# Patient Record
Sex: FEMALE | Race: BLACK OR AFRICAN AMERICAN | NOT HISPANIC OR LATINO | URBAN - NONMETROPOLITAN AREA
[De-identification: names, ages, dates, MRNs, and addresses within clinical notes are randomized per-mention and may not be internally consistent; named-entity substitution may affect disease eponyms.]

---

## 2020-07-01 ENCOUNTER — OFFICE VISIT (OUTPATIENT)
Dept: URBAN - NONMETROPOLITAN AREA CLINIC 2 | Facility: CLINIC | Age: 50
End: 2020-07-01

## 2020-09-01 ENCOUNTER — TELEPHONE ENCOUNTER (OUTPATIENT)
Dept: URBAN - METROPOLITAN AREA CLINIC 92 | Facility: CLINIC | Age: 50
End: 2020-09-01

## 2020-09-01 RX ORDER — DIPHENOXYLATE HCL/ATROPINE 2.5-.025MG
TAKE 2 TABLETS (5 MG) BY ORAL ROUTE 4 TIMES PER DAY AS NEEDED FOR 30 DAYS TABLET ORAL
Qty: 240 | Refills: 3
Start: 2020-03-09

## 2020-09-10 ENCOUNTER — OFFICE VISIT (OUTPATIENT)
Dept: URBAN - NONMETROPOLITAN AREA CLINIC 2 | Facility: CLINIC | Age: 50
End: 2020-09-10

## 2020-09-23 ENCOUNTER — OFFICE VISIT (OUTPATIENT)
Dept: URBAN - NONMETROPOLITAN AREA CLINIC 2 | Facility: CLINIC | Age: 50
End: 2020-09-23
Payer: COMMERCIAL

## 2020-09-23 DIAGNOSIS — R19.7 DIARRHEA: ICD-10-CM

## 2020-09-23 DIAGNOSIS — R10.9 ABDOMINAL PAIN: ICD-10-CM

## 2020-09-23 DIAGNOSIS — R93.89 ABNORMAL FINDING ON IMAGING: ICD-10-CM

## 2020-09-23 DIAGNOSIS — K21.9 GERD (GASTROESOPHAGEAL REFLUX DISEASE): ICD-10-CM

## 2020-09-23 PROCEDURE — 99213 OFFICE O/P EST LOW 20 MIN: CPT | Performed by: NURSE PRACTITIONER

## 2020-09-23 PROCEDURE — G8427 DOCREV CUR MEDS BY ELIG CLIN: HCPCS | Performed by: NURSE PRACTITIONER

## 2020-09-23 PROCEDURE — G8420 CALC BMI NORM PARAMETERS: HCPCS | Performed by: NURSE PRACTITIONER

## 2020-09-23 PROCEDURE — 3017F COLORECTAL CA SCREEN DOC REV: CPT | Performed by: NURSE PRACTITIONER

## 2020-09-23 RX ORDER — AMITRIPTYLINE HYDROCHLORIDE 25 MG/1
1 TABLET AT BEDTIME TABLET, FILM COATED ORAL ONCE A DAY
Qty: 30 TABLET | Refills: 11
Start: 2020-04-28

## 2020-09-23 RX ORDER — LORAZEPAM 1 MG/1
TAKE 1 TABLET BY ORAL ROUTE 2 TIMES A DAY AS NEEDED TABLET ORAL 2
Qty: 0 | Refills: 0 | Status: ACTIVE | COMMUNITY
Start: 1900-01-01

## 2020-09-23 RX ORDER — FAMOTIDINE 40 MG/1
TAKE 1 TABLET (40 MG) BY ORAL ROUTE 2 TIMES PER DAY FOR 90 DAYS TABLET, FILM COATED ORAL 2
Qty: 180 | Refills: 3 | Status: ACTIVE | COMMUNITY
Start: 2019-12-30 | End: 2020-12-24

## 2020-09-23 RX ORDER — DIPHENOXYLATE HYDROCHLORIDE AND ATROPINE SULFATE 2.5; .025 MG/1; MG/1
TAKE 1 TABLET BY ORAL ROUTE 4 TIMES A DAY TABLET ORAL
Qty: 0 | Refills: 0 | Status: ACTIVE | COMMUNITY
Start: 1900-01-01

## 2020-09-23 RX ORDER — PROMETHAZINE HYDROCHLORIDE 6.25 MG/5ML
TAKE 5 MILLILITERS BY ORAL ROUTE 3 TIMES A DAY AS NEEDED SOLUTION ORAL
Qty: 0 | Refills: 0 | Status: ACTIVE | COMMUNITY
Start: 1900-01-01

## 2020-09-23 RX ORDER — CIPROFLOXACIN HYDROCHLORIDE 500 MG/1
1 TABLET TABLET, FILM COATED ORAL
Qty: 28 TABLET | Refills: 0 | OUTPATIENT
Start: 2020-09-23 | End: 2020-10-07

## 2020-09-23 RX ORDER — ZOLPIDEM TARTRATE 10 MG/1
TAKE 1 TABLET (10 MG) BY ORAL ROUTE ONCE DAILY AT BEDTIME TABLET, FILM COATED ORAL 1
Qty: 0 | Refills: 0 | Status: ACTIVE | COMMUNITY
Start: 1900-01-01

## 2020-09-23 RX ORDER — DICYCLOMINE HYDROCHLORIDE 10 MG/1
TAKE 1 CAPSULE (10 MG) BY ORAL ROUTE 3 TIMES PER DAY FOR 30 DAYS CAPSULE ORAL
Qty: 90 | Refills: 6
Start: 2020-04-28

## 2020-09-23 RX ORDER — DIPHENOXYLATE HCL/ATROPINE 2.5-.025MG
TAKE 2 TABLETS (5 MG) BY ORAL ROUTE 4 TIMES PER DAY AS NEEDED FOR 30 DAYS TABLET ORAL
Qty: 240 | Refills: 3 | Status: ACTIVE | COMMUNITY
Start: 2020-03-09

## 2020-09-23 RX ORDER — OXYBUTYNIN CHLORIDE 5 MG/1
TAKE 1 TABLET BY ORAL ROUTE DAILY TABLET ORAL 1
Qty: 0 | Refills: 0 | Status: ACTIVE | COMMUNITY
Start: 1900-01-01

## 2020-09-23 RX ORDER — AMITRIPTYLINE HYDROCHLORIDE 10 MG/1
TAKE 1 TABLET BY ORAL ROUTE ONCE A DAY (AT BEDTIME) TABLET, FILM COATED ORAL 1
Qty: 30 | Refills: 11 | Status: ACTIVE | COMMUNITY
Start: 2020-04-28 | End: 2021-04-23

## 2020-09-23 RX ORDER — CETIRIZINE HYDROCHLORIDE 10 MG/1
TAKE 1 TABLET (10 MG) BY ORAL ROUTE ONCE DAILY TABLET, FILM COATED ORAL 1
Qty: 0 | Refills: 0 | Status: ACTIVE | COMMUNITY
Start: 1900-01-01

## 2020-09-23 RX ORDER — CHOLESTYRAMINE 4 G/9G
TAKE 2 SCOOPS (8 GRAM) EACH SCOOP BEING DISSOLVED IN 2 TO 6 OUNCES OF WATER OR NONCARBONATED BEVERAGE BY PO BID POWDER, FOR SUSPENSION ORAL 2
Qty: 30 | Refills: 6 | Status: ACTIVE | COMMUNITY
Start: 2020-04-28 | End: 2020-11-24

## 2020-09-23 RX ORDER — DICYCLOMINE HYDROCHLORIDE 10 MG/1
TAKE 1 CAPSULE (10 MG) BY ORAL ROUTE 3 TIMES PER DAY FOR 30 DAYS CAPSULE ORAL
Qty: 90 | Refills: 6 | Status: ACTIVE | COMMUNITY
Start: 2020-04-28 | End: 2020-11-24

## 2020-09-23 RX ORDER — METRONIDAZOLE 500 MG/1
1 TABLET TABLET, FILM COATED ORAL THREE TIMES A DAY
Qty: 42 TABLET | Refills: 0 | OUTPATIENT
Start: 2020-09-23 | End: 2020-10-07

## 2020-09-23 RX ORDER — POTASSIUM CHLORIDE 1500 MG/1
TAKE 1 TABLET (20 MEQ) BY ORAL ROUTE ONCE DAILY WITH FOOD TABLET, FILM COATED, EXTENDED RELEASE ORAL 1
Qty: 0 | Refills: 0 | Status: ACTIVE | COMMUNITY
Start: 1900-01-01

## 2020-09-23 RX ORDER — FUROSEMIDE 40 MG/1
TAKE 1 TABLET (40 MG) BY ORAL ROUTE 2 TIMES PER DAY TABLET ORAL 2
Qty: 0 | Refills: 0 | Status: ACTIVE | COMMUNITY
Start: 1900-01-01

## 2020-09-23 RX ORDER — KETOCONAZOLE 20 MG/G
APPLY TO THE AFFECTED AREA(S) BY TOPICAL ROUTE ONCE DAILY CREAM TOPICAL 1
Qty: 1 | Refills: 0 | Status: ACTIVE | COMMUNITY
Start: 1900-01-01

## 2020-09-23 RX ORDER — HYDRALAZINE HYDROCHLORIDE 50 MG/1
TAKE 1 TABLET BY ORAL ROUTE 3 TIMES A DAY TABLET ORAL
Qty: 0 | Refills: 0 | Status: ACTIVE | COMMUNITY
Start: 1900-01-01

## 2020-09-23 RX ORDER — OMEPRAZOLE 20 MG/1
TAKE 1 CAPSULE (20 MG) BY ORAL ROUTE ONCE DAILY BEFORE A MEAL CAPSULE, DELAYED RELEASE ORAL 1
Qty: 0 | Refills: 0 | Status: ACTIVE | COMMUNITY
Start: 1900-01-01

## 2020-09-23 RX ORDER — ALBUTEROL SULFATE 108 UG/1
INHALE 1 PUFF (90 MCG) BY INHALATION ROUTE EVERY 6 HOURS AS NEEDED AEROSOL, METERED RESPIRATORY (INHALATION)
Qty: 1 | Refills: 0 | Status: ACTIVE | COMMUNITY
Start: 1900-01-01

## 2020-09-23 RX ORDER — GABAPENTIN 300 MG/1
TAKE 1 CAPSULE (300 MG) BY ORAL ROUTE 3 TIMES PER DAY CAPSULE ORAL
Qty: 0 | Refills: 0 | Status: ACTIVE | COMMUNITY
Start: 1900-01-01

## 2020-09-23 RX ORDER — TIZANIDINE HYDROCHLORIDE 4 MG/1
TAKE 1 CAPSULE (4 MG) BY ORAL ROUTE 3 TIMES PER DAY CAPSULE ORAL
Qty: 0 | Refills: 0 | Status: ACTIVE | COMMUNITY
Start: 1900-01-01

## 2020-09-23 RX ORDER — HALOBETASOL PROPIONATE 0.5 MG/G
APPLY A THIN LAYER TO THE AFFECTED AREA(S) BY TOPICAL ROUTE ONCE DAILY CREAM TOPICAL 1
Qty: 1 | Refills: 0 | Status: ACTIVE | COMMUNITY
Start: 1900-01-01

## 2020-09-23 RX ORDER — PROMETHAZINE HYDROCHLORIDE 25 MG/1
TAKE 1 TABLET (25 MG) BY ORAL ROUTE EVERY 4-6 HOURS AS NEEDED TABLET ORAL
Qty: 0 | Refills: 0 | Status: ACTIVE | COMMUNITY
Start: 1900-01-01

## 2020-09-23 RX ORDER — PROPRANOLOL HYDROCHLORIDE 80 MG/1
TAKE 1 TABLET BY ORAL ROUTE DAILY TABLET ORAL 1
Qty: 0 | Refills: 0 | Status: ACTIVE | COMMUNITY
Start: 1900-01-01

## 2020-09-23 RX ORDER — CHOLESTYRAMINE 4 G/9G
TAKE 2 SCOOPS (8 GRAM) EACH SCOOP BEING DISSOLVED IN 2 TO 6 OUNCES OF WATER OR NONCARBONATED BEVERAGE BY PO BID POWDER, FOR SUSPENSION ORAL 2
Qty: 30 | Refills: 6
Start: 2020-04-28

## 2020-09-23 RX ORDER — HYDROCODONE BITARTRATE AND ACETAMINOPHEN 10; 325 MG/1; MG/1
TAKE 1 TABLET BY ORAL ROUTE EVERY 6 HOURS AS NEEDED FOR PAIN TABLET ORAL
Qty: 0 | Refills: 0 | Status: ACTIVE | COMMUNITY
Start: 1900-01-01

## 2020-09-23 RX ORDER — DIPHENOXYLATE HYDROCHLORIDE AND ATROPINE SULFATE 2.5; .025 MG/1; MG/1
TAKE 1 TABLET BY ORAL ROUTE 4 TIMES A DAY TABLET ORAL
Qty: 120 | Refills: 6
Start: 1900-01-01 | End: 1900-07-30

## 2020-09-23 RX ORDER — HYDROXYZINE HYDROCHLORIDE 25 MG/1
TAKE 1 TABLET BY ORAL ROUTE 4 TIMES A DAY TABLET, FILM COATED ORAL
Qty: 0 | Refills: 0 | Status: ACTIVE | COMMUNITY
Start: 1900-01-01

## 2020-09-23 NOTE — HPI-TODAY'S VISIT:
Ms. Riddle is here for f/u of abdominal pain and diarrhea. She was last seen in April. She was doing ok on her medicaiton regimen of lomotil, questran, AMT, bentyl. She never completed the stool studies. She has had recurrent LUQ pain. She thinks she may have a hernia. Last CT scan showed possible diverticulitis. She has been under a lot of stress and her fiance passed away from a fall at work. She denies any changes in her weight. She denies any bleeding. CS

## 2020-11-04 ENCOUNTER — OFFICE VISIT (OUTPATIENT)
Dept: URBAN - NONMETROPOLITAN AREA CLINIC 2 | Facility: CLINIC | Age: 50
End: 2020-11-04
Payer: COMMERCIAL

## 2020-11-04 ENCOUNTER — LAB OUTSIDE AN ENCOUNTER (OUTPATIENT)
Dept: URBAN - NONMETROPOLITAN AREA CLINIC 2 | Facility: CLINIC | Age: 50
End: 2020-11-04

## 2020-11-04 DIAGNOSIS — R19.7 DIARRHEA: ICD-10-CM

## 2020-11-04 DIAGNOSIS — K21.9 GERD (GASTROESOPHAGEAL REFLUX DISEASE): ICD-10-CM

## 2020-11-04 DIAGNOSIS — R93.89 ABNORMAL FINDING ON IMAGING: ICD-10-CM

## 2020-11-04 DIAGNOSIS — R10.9 ABDOMINAL PAIN: ICD-10-CM

## 2020-11-04 PROCEDURE — 99213 OFFICE O/P EST LOW 20 MIN: CPT | Performed by: NURSE PRACTITIONER

## 2020-11-04 PROCEDURE — G8427 DOCREV CUR MEDS BY ELIG CLIN: HCPCS | Performed by: NURSE PRACTITIONER

## 2020-11-04 PROCEDURE — G8420 CALC BMI NORM PARAMETERS: HCPCS | Performed by: NURSE PRACTITIONER

## 2020-11-04 PROCEDURE — 3017F COLORECTAL CA SCREEN DOC REV: CPT | Performed by: NURSE PRACTITIONER

## 2020-11-04 RX ORDER — AMITRIPTYLINE HYDROCHLORIDE 25 MG/1
1 TABLET AT BEDTIME TABLET, FILM COATED ORAL ONCE A DAY
Qty: 30 TABLET | Refills: 11 | Status: ACTIVE | COMMUNITY
Start: 2020-04-28

## 2020-11-04 RX ORDER — FUROSEMIDE 40 MG/1
TAKE 1 TABLET (40 MG) BY ORAL ROUTE 2 TIMES PER DAY TABLET ORAL 2
Qty: 0 | Refills: 0 | Status: ACTIVE | COMMUNITY
Start: 1900-01-01

## 2020-11-04 RX ORDER — CHOLESTYRAMINE 4 G/9G
TAKE 2 SCOOPS (8 GRAM) EACH SCOOP BEING DISSOLVED IN 2 TO 6 OUNCES OF WATER OR NONCARBONATED BEVERAGE BY PO BID POWDER, FOR SUSPENSION ORAL 2
Qty: 30 | Refills: 6 | Status: ACTIVE | COMMUNITY
Start: 2020-04-28

## 2020-11-04 RX ORDER — DICYCLOMINE HYDROCHLORIDE 10 MG/1
TAKE 1 CAPSULE (10 MG) BY ORAL ROUTE 3 TIMES PER DAY FOR 30 DAYS CAPSULE ORAL
Qty: 90 | Refills: 6 | Status: ACTIVE | COMMUNITY
Start: 2020-04-28

## 2020-11-04 RX ORDER — TIZANIDINE HYDROCHLORIDE 4 MG/1
TAKE 1 CAPSULE (4 MG) BY ORAL ROUTE 3 TIMES PER DAY CAPSULE ORAL
Qty: 0 | Refills: 0 | Status: ACTIVE | COMMUNITY
Start: 1900-01-01

## 2020-11-04 RX ORDER — OMEPRAZOLE 20 MG/1
TAKE 1 CAPSULE (20 MG) BY ORAL ROUTE ONCE DAILY BEFORE A MEAL CAPSULE, DELAYED RELEASE ORAL 1
Qty: 0 | Refills: 0 | Status: ACTIVE | COMMUNITY
Start: 1900-01-01

## 2020-11-04 RX ORDER — AMITRIPTYLINE HYDROCHLORIDE 25 MG/1
1 TABLET AT BEDTIME TABLET, FILM COATED ORAL ONCE A DAY
Qty: 30 TABLET | Refills: 11

## 2020-11-04 RX ORDER — PROPRANOLOL HYDROCHLORIDE 80 MG/1
TAKE 1 TABLET BY ORAL ROUTE DAILY TABLET ORAL 1
Qty: 0 | Refills: 0 | Status: ACTIVE | COMMUNITY
Start: 1900-01-01

## 2020-11-04 RX ORDER — HYDRALAZINE HYDROCHLORIDE 50 MG/1
TAKE 1 TABLET BY ORAL ROUTE 3 TIMES A DAY TABLET ORAL
Qty: 0 | Refills: 0 | Status: ACTIVE | COMMUNITY
Start: 1900-01-01

## 2020-11-04 RX ORDER — CETIRIZINE HYDROCHLORIDE 10 MG/1
TAKE 1 TABLET (10 MG) BY ORAL ROUTE ONCE DAILY TABLET, FILM COATED ORAL 1
Qty: 0 | Refills: 0 | Status: ACTIVE | COMMUNITY
Start: 1900-01-01

## 2020-11-04 RX ORDER — KETOCONAZOLE 20 MG/G
APPLY TO THE AFFECTED AREA(S) BY TOPICAL ROUTE ONCE DAILY CREAM TOPICAL 1
Qty: 1 | Refills: 0 | Status: ACTIVE | COMMUNITY
Start: 1900-01-01

## 2020-11-04 RX ORDER — CHOLESTYRAMINE 4 G/9G
TAKE 2 SCOOPS (8 GRAM) EACH SCOOP BEING DISSOLVED IN 2 TO 6 OUNCES OF WATER OR NONCARBONATED BEVERAGE BY PO BID POWDER, FOR SUSPENSION ORAL 2
Qty: 30 | Refills: 6

## 2020-11-04 RX ORDER — HALOBETASOL PROPIONATE 0.5 MG/G
APPLY A THIN LAYER TO THE AFFECTED AREA(S) BY TOPICAL ROUTE ONCE DAILY CREAM TOPICAL 1
Qty: 1 | Refills: 0 | Status: ACTIVE | COMMUNITY
Start: 1900-01-01

## 2020-11-04 RX ORDER — LORAZEPAM 1 MG/1
TAKE 1 TABLET BY ORAL ROUTE 2 TIMES A DAY AS NEEDED TABLET ORAL 2
Qty: 0 | Refills: 0 | Status: ACTIVE | COMMUNITY
Start: 1900-01-01

## 2020-11-04 RX ORDER — ZOLPIDEM TARTRATE 10 MG/1
TAKE 1 TABLET (10 MG) BY ORAL ROUTE ONCE DAILY AT BEDTIME TABLET, FILM COATED ORAL 1
Qty: 0 | Refills: 0 | Status: ACTIVE | COMMUNITY
Start: 1900-01-01

## 2020-11-04 RX ORDER — PROMETHAZINE HYDROCHLORIDE 6.25 MG/5ML
TAKE 5 MILLILITERS BY ORAL ROUTE 3 TIMES A DAY AS NEEDED SOLUTION ORAL
Qty: 0 | Refills: 0 | Status: ACTIVE | COMMUNITY
Start: 1900-01-01

## 2020-11-04 RX ORDER — FAMOTIDINE 40 MG/1
TAKE 1 TABLET (40 MG) BY ORAL ROUTE 2 TIMES PER DAY FOR 90 DAYS TABLET, FILM COATED ORAL 2
Qty: 180 | Refills: 3 | Status: ACTIVE | COMMUNITY
Start: 2019-12-30 | End: 2020-12-24

## 2020-11-04 RX ORDER — DIPHENOXYLATE HCL/ATROPINE 2.5-.025MG
TAKE 2 TABLETS (5 MG) BY ORAL ROUTE 4 TIMES PER DAY AS NEEDED FOR 30 DAYS TABLET ORAL
Qty: 240 | Refills: 3 | Status: ACTIVE | COMMUNITY
Start: 2020-03-09

## 2020-11-04 RX ORDER — GABAPENTIN 300 MG/1
TAKE 1 CAPSULE (300 MG) BY ORAL ROUTE 3 TIMES PER DAY CAPSULE ORAL
Qty: 0 | Refills: 0 | Status: ACTIVE | COMMUNITY
Start: 1900-01-01

## 2020-11-04 RX ORDER — POTASSIUM CHLORIDE 1500 MG/1
TAKE 1 TABLET (20 MEQ) BY ORAL ROUTE ONCE DAILY WITH FOOD TABLET, FILM COATED, EXTENDED RELEASE ORAL 1
Qty: 0 | Refills: 0 | Status: ACTIVE | COMMUNITY
Start: 1900-01-01

## 2020-11-04 RX ORDER — PROMETHAZINE HYDROCHLORIDE 25 MG/1
TAKE 1 TABLET (25 MG) BY ORAL ROUTE EVERY 4-6 HOURS AS NEEDED TABLET ORAL
Qty: 0 | Refills: 0 | Status: ACTIVE | COMMUNITY
Start: 1900-01-01

## 2020-11-04 RX ORDER — DIPHENOXYLATE HCL/ATROPINE 2.5-.025MG
TAKE 2 TABLETS (5 MG) BY ORAL ROUTE 4 TIMES PER DAY AS NEEDED FOR 30 DAYS TABLET ORAL
Qty: 240 | Refills: 3
Start: 2020-03-09 | End: 2020-07-07

## 2020-11-04 RX ORDER — OXYBUTYNIN CHLORIDE 5 MG/1
TAKE 1 TABLET BY ORAL ROUTE DAILY TABLET ORAL 1
Qty: 0 | Refills: 0 | Status: ACTIVE | COMMUNITY
Start: 1900-01-01

## 2020-11-04 RX ORDER — HYDROCODONE BITARTRATE AND ACETAMINOPHEN 10; 325 MG/1; MG/1
TAKE 1 TABLET BY ORAL ROUTE EVERY 6 HOURS AS NEEDED FOR PAIN TABLET ORAL
Qty: 0 | Refills: 0 | Status: ACTIVE | COMMUNITY
Start: 1900-01-01

## 2020-11-04 RX ORDER — DICYCLOMINE HYDROCHLORIDE 10 MG/1
TAKE 1 CAPSULE (10 MG) BY ORAL ROUTE 3 TIMES PER DAY FOR 30 DAYS CAPSULE ORAL
Qty: 90 | Refills: 6

## 2020-11-04 RX ORDER — ALBUTEROL SULFATE 108 UG/1
INHALE 1 PUFF (90 MCG) BY INHALATION ROUTE EVERY 6 HOURS AS NEEDED AEROSOL, METERED RESPIRATORY (INHALATION)
Qty: 1 | Refills: 0 | Status: ACTIVE | COMMUNITY
Start: 1900-01-01

## 2020-11-04 RX ORDER — HYDROXYZINE HYDROCHLORIDE 25 MG/1
TAKE 1 TABLET BY ORAL ROUTE 4 TIMES A DAY TABLET, FILM COATED ORAL
Qty: 0 | Refills: 0 | Status: ACTIVE | COMMUNITY
Start: 1900-01-01

## 2020-11-04 NOTE — HPI-OTHER HISTORIES
History Of Present Illness    2019 Ms. Daphnie Riddle is here for f/u of chronic diarrhea and abdominal pain. She has a very complicated GI history. She has been treated by Dr. Garcia most recently. She had a colon perforation several years ago with subsequent right sided colon resection. She had an ostomy that was able to be reversed. She then had a ventral hernia that was repaired with mesh. She has had lower abdominal pain since. She had a CT scan with a low ventral hernia with a loop of SB. This is non obstructive at this time. She is taking bentyl with some improvement.  She has been having terrible diarrhea multiple times a day since her colon surgery.  She had normal stool studies and labs with a positive t-transglutaminase (tTG) IgG and elevated inflammatory markers. She had a normal EGD and colonoscopy with normal random bx. She is having some improvement with lomotil and cholestryramine. Her reflux is a little worse today. CS  2019 Ms. Riddle is here for f/u of diarrhea and GERD. Her cholestyramine was increased at her last OV. Her bowels are now fairly normal. She can now leave the house without worrying about having an accident. She continues to have some mid abdominal cramping. She is not taking the bentyl. She was started on zantac at her last OV and now her reflux is well controlled. Overall she is feeling better today. CS  2019 Ms. Riddle is here for diarrhea and abdominal pain. Her bowels are fairly normal with questran QID and lomotil prn. She has had to use the lomotil more frequently since her hernia surgery on the . She is in a lot of abdominal pain. This is around her ribs and incisions. She follows up with her surgeon 1/3. She is taking the bentyl 10mg BID. She has not noticed a change but not sure since she had surgery. Her reflux is not controlled on pepcid 20mg BID- worse at night. CS  3/9/2020 Ms. Riddle is here for f/u of abdominal pain and diarrhea. She was last seen in December after her hernia surgery. She saw Dr. Ritter soon after. She had a CT scan that showed inflammation of her colon along an area of a diverticuli. He ordered stool studies and was going to treat for diverticulitis if negative. She could not do the stool studies due to transportation. She was advised to f/u with us. She continues to have mid abdominal pain worse on the right midline. She continues to have diarrhea worse after eating despite choelstryramine and bentyl. She admits to very high stress. Her mother  last week and prior to that her aunt. 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 CS     Daphnie returns for follow up. She is doing ok  but still having pain. She has not had stool studies sent in as she has had three deaths in the family and dealing with children during the COVID issues. She is tolerating symptoms relatively well. Seems to be managing relatively well.  She has a lot of stress with her family in the house during this shelter at home time.  Her symptoms are tolerable but she is needing medications refilled.  She needs refills for lomoil, questran, AMT 10mg, bentyl.  I spent 7 minutes on telephone call as she could not get Tele[facility] appointment set up properly   2020 Ms. Riddle is here for f/u of abdominal pain and diarrhea. She was last seen in April. She was doing ok on her medicaiton regimen of lomotil, questran, AMT, bentyl. She never completed the stool studies. She has had recurrent LUQ pain. She thinks she may have a hernia. Last CT scan showed possible diverticulitis. She has been under a lot of stress and her fiance passed away from a fall at work. She denies any changes in her weight. She denies any bleeding. CS

## 2020-11-04 NOTE — HPI-TODAY'S VISIT:
Ms. Riddle is here for f/u of abdominal pain and diarrhea. At her last OV, she was having a lot of diarrhea and abdominal pain. She was treated for possible diverticulitis with cipro and flagyl. She had an increase in her stress level after the death of her fiance. Her AMT was increased to 25mg. Today, her abdominal pain is about the same. It was getting better while on the antibx and then returned as soon as they were completed. She denies any fevers. Her diarrhea is about the same. She is out of lomotil. When she takes 2 pills at a time this does help. She is also identifying triggers. CS

## 2020-11-05 LAB
A/G RATIO: 1.6
ALBUMIN: 4.3
ALKALINE PHOSPHATASE: 112
ALT (SGPT): 35
AST (SGOT): 17
BASO (ABSOLUTE): 0.1
BASOS: 1
BILIRUBIN, TOTAL: <0.2
BUN/CREATININE RATIO: 11
BUN: 7
C-REACTIVE PROTEIN, QUANT: 4
CALCIUM: 9
CARBON DIOXIDE, TOTAL: 22
CHLORIDE: 107
CREATININE: 0.65
EGFR IF AFRICN AM: 118
EGFR IF NONAFRICN AM: 102
EOS (ABSOLUTE): 0.1
EOS: 1
GLOBULIN, TOTAL: 2.7
GLUCOSE: 88
HEMATOCRIT: 40.2
HEMATOLOGY COMMENTS:: (no result)
HEMOGLOBIN: 12.9
IMMATURE CELLS: (no result)
IMMATURE GRANS (ABS): 0
IMMATURE GRANULOCYTES: 0
LYMPHS (ABSOLUTE): 2
LYMPHS: 31
MCH: 28.9
MCHC: 32.1
MCV: 90
MONOCYTES(ABSOLUTE): 0.4
MONOCYTES: 7
NEUTROPHILS (ABSOLUTE): 3.8
NEUTROPHILS: 60
NRBC: (no result)
PLATELETS: 196
POTASSIUM: 4
PROTEIN, TOTAL: 7
RBC: 4.46
RDW: 13.8
SEDIMENTATION RATE-WESTERGREN: 41
SODIUM: 142
WBC: 6.3

## 2021-03-24 ENCOUNTER — LAB OUTSIDE AN ENCOUNTER (OUTPATIENT)
Dept: URBAN - NONMETROPOLITAN AREA CLINIC 2 | Facility: CLINIC | Age: 51
End: 2021-03-24

## 2021-03-24 ENCOUNTER — OFFICE VISIT (OUTPATIENT)
Dept: URBAN - NONMETROPOLITAN AREA CLINIC 2 | Facility: CLINIC | Age: 51
End: 2021-03-24
Payer: COMMERCIAL

## 2021-03-24 VITALS
TEMPERATURE: 96.9 F | DIASTOLIC BLOOD PRESSURE: 101 MMHG | HEART RATE: 77 BPM | BODY MASS INDEX: 35.5 KG/M2 | WEIGHT: 248 LBS | HEIGHT: 70 IN | SYSTOLIC BLOOD PRESSURE: 167 MMHG

## 2021-03-24 DIAGNOSIS — K21.9 GERD (GASTROESOPHAGEAL REFLUX DISEASE): ICD-10-CM

## 2021-03-24 DIAGNOSIS — R93.89 ABNORMAL FINDING ON IMAGING: ICD-10-CM

## 2021-03-24 DIAGNOSIS — R19.7 DIARRHEA: ICD-10-CM

## 2021-03-24 DIAGNOSIS — R10.9 ABDOMINAL PAIN: ICD-10-CM

## 2021-03-24 PROCEDURE — 99214 OFFICE O/P EST MOD 30 MIN: CPT | Performed by: NURSE PRACTITIONER

## 2021-03-24 RX ORDER — METRONIDAZOLE 250 MG/1
1 TABLET TABLET ORAL THREE TIMES A DAY
Qty: 42 TABLET | Refills: 0 | OUTPATIENT
Start: 2021-03-24 | End: 2021-04-07

## 2021-03-24 RX ORDER — DICYCLOMINE HYDROCHLORIDE 10 MG/1
TAKE 1 CAPSULE (10 MG) BY ORAL ROUTE 3 TIMES PER DAY FOR 30 DAYS CAPSULE ORAL
Qty: 90 | Refills: 6 | Status: ACTIVE | COMMUNITY

## 2021-03-24 RX ORDER — HYDROCODONE BITARTRATE AND ACETAMINOPHEN 10; 325 MG/1; MG/1
TAKE 1 TABLET BY ORAL ROUTE EVERY 6 HOURS AS NEEDED FOR PAIN TABLET ORAL
Qty: 0 | Refills: 0 | Status: ACTIVE | COMMUNITY
Start: 1900-01-01

## 2021-03-24 RX ORDER — HYDROXYZINE HYDROCHLORIDE 25 MG/1
TAKE 1 TABLET BY ORAL ROUTE 4 TIMES A DAY TABLET, FILM COATED ORAL
Qty: 0 | Refills: 0 | Status: ACTIVE | COMMUNITY
Start: 1900-01-01

## 2021-03-24 RX ORDER — LORAZEPAM 1 MG/1
TAKE 1 TABLET BY ORAL ROUTE 2 TIMES A DAY AS NEEDED TABLET ORAL 2
Qty: 0 | Refills: 0 | Status: ACTIVE | COMMUNITY
Start: 1900-01-01

## 2021-03-24 RX ORDER — DIPHENOXYLATE HYDROCHLORIDE AND ATROPINE SULFATE 2.5; .025 MG/1; MG/1
TAKE 1 TABLET BY ORAL ROUTE 4 TIMES A DAY TABLET ORAL
Qty: 120 | Refills: 6

## 2021-03-24 RX ORDER — TIZANIDINE HYDROCHLORIDE 4 MG/1
TAKE 1 CAPSULE (4 MG) BY ORAL ROUTE 3 TIMES PER DAY CAPSULE ORAL
Qty: 0 | Refills: 0 | Status: ACTIVE | COMMUNITY
Start: 1900-01-01

## 2021-03-24 RX ORDER — CHOLESTYRAMINE 4 G/9G
TAKE 2 SCOOPS (8 GRAM) EACH SCOOP BEING DISSOLVED IN 2 TO 6 OUNCES OF WATER OR NONCARBONATED BEVERAGE BY PO BID POWDER, FOR SUSPENSION ORAL 2
Qty: 30 | Refills: 6 | Status: ACTIVE | COMMUNITY

## 2021-03-24 RX ORDER — AMITRIPTYLINE HYDROCHLORIDE 25 MG/1
1 TABLET AT BEDTIME TABLET, FILM COATED ORAL ONCE A DAY
Qty: 30 TABLET | Refills: 11

## 2021-03-24 RX ORDER — OMEPRAZOLE 20 MG/1
TAKE 1 CAPSULE (20 MG) BY ORAL ROUTE ONCE DAILY BEFORE A MEAL CAPSULE, DELAYED RELEASE ORAL 1
Qty: 0 | Refills: 0 | Status: ACTIVE | COMMUNITY
Start: 1900-01-01

## 2021-03-24 RX ORDER — ZOLPIDEM TARTRATE 10 MG/1
TAKE 1 TABLET (10 MG) BY ORAL ROUTE ONCE DAILY AT BEDTIME TABLET, FILM COATED ORAL 1
Qty: 0 | Refills: 0 | Status: ACTIVE | COMMUNITY
Start: 1900-01-01

## 2021-03-24 RX ORDER — PROMETHAZINE HYDROCHLORIDE 25 MG/1
TAKE 1 TABLET (25 MG) BY ORAL ROUTE EVERY 4-6 HOURS AS NEEDED TABLET ORAL
Qty: 0 | Refills: 0 | Status: ACTIVE | COMMUNITY
Start: 1900-01-01

## 2021-03-24 RX ORDER — AMITRIPTYLINE HYDROCHLORIDE 25 MG/1
1 TABLET AT BEDTIME TABLET, FILM COATED ORAL ONCE A DAY
Qty: 30 TABLET | Refills: 11 | Status: ACTIVE | COMMUNITY

## 2021-03-24 RX ORDER — OXYBUTYNIN CHLORIDE 5 MG/1
TAKE 1 TABLET BY ORAL ROUTE DAILY TABLET ORAL 1
Qty: 0 | Refills: 0 | Status: ACTIVE | COMMUNITY
Start: 1900-01-01

## 2021-03-24 RX ORDER — KETOCONAZOLE 20 MG/G
APPLY TO THE AFFECTED AREA(S) BY TOPICAL ROUTE ONCE DAILY CREAM TOPICAL 1
Qty: 1 | Refills: 0 | Status: ACTIVE | COMMUNITY
Start: 1900-01-01

## 2021-03-24 RX ORDER — HYDRALAZINE HYDROCHLORIDE 50 MG/1
TAKE 1 TABLET BY ORAL ROUTE 3 TIMES A DAY TABLET ORAL
Qty: 0 | Refills: 0 | Status: ACTIVE | COMMUNITY
Start: 1900-01-01

## 2021-03-24 RX ORDER — DICYCLOMINE HYDROCHLORIDE 10 MG/1
TAKE 1 CAPSULE (10 MG) BY ORAL ROUTE 3 TIMES PER DAY FOR 30 DAYS CAPSULE ORAL
Qty: 90 | Refills: 6

## 2021-03-24 RX ORDER — FUROSEMIDE 40 MG/1
TAKE 1 TABLET (40 MG) BY ORAL ROUTE 2 TIMES PER DAY TABLET ORAL 2
Qty: 0 | Refills: 0 | Status: ACTIVE | COMMUNITY
Start: 1900-01-01

## 2021-03-24 RX ORDER — PROMETHAZINE HYDROCHLORIDE 6.25 MG/5ML
TAKE 5 MILLILITERS BY ORAL ROUTE 3 TIMES A DAY AS NEEDED SOLUTION ORAL
Qty: 0 | Refills: 0 | Status: ACTIVE | COMMUNITY
Start: 1900-01-01

## 2021-03-24 RX ORDER — GABAPENTIN 300 MG/1
TAKE 1 CAPSULE (300 MG) BY ORAL ROUTE 3 TIMES PER DAY CAPSULE ORAL
Qty: 0 | Refills: 0 | Status: ACTIVE | COMMUNITY
Start: 1900-01-01

## 2021-03-24 RX ORDER — CETIRIZINE HYDROCHLORIDE 10 MG/1
TAKE 1 TABLET (10 MG) BY ORAL ROUTE ONCE DAILY TABLET, FILM COATED ORAL 1
Qty: 0 | Refills: 0 | Status: ACTIVE | COMMUNITY
Start: 1900-01-01

## 2021-03-24 RX ORDER — PROPRANOLOL HYDROCHLORIDE 80 MG/1
TAKE 1 TABLET BY ORAL ROUTE DAILY TABLET ORAL 1
Qty: 0 | Refills: 0 | Status: ACTIVE | COMMUNITY
Start: 1900-01-01

## 2021-03-24 RX ORDER — ALBUTEROL SULFATE 108 UG/1
INHALE 1 PUFF (90 MCG) BY INHALATION ROUTE EVERY 6 HOURS AS NEEDED AEROSOL, METERED RESPIRATORY (INHALATION)
Qty: 1 | Refills: 0 | Status: ACTIVE | COMMUNITY
Start: 1900-01-01

## 2021-03-24 RX ORDER — HALOBETASOL PROPIONATE 0.5 MG/G
APPLY A THIN LAYER TO THE AFFECTED AREA(S) BY TOPICAL ROUTE ONCE DAILY CREAM TOPICAL 1
Qty: 1 | Refills: 0 | Status: ACTIVE | COMMUNITY
Start: 1900-01-01

## 2021-03-24 RX ORDER — POTASSIUM CHLORIDE 1500 MG/1
TAKE 1 TABLET (20 MEQ) BY ORAL ROUTE ONCE DAILY WITH FOOD TABLET, FILM COATED, EXTENDED RELEASE ORAL 1
Qty: 0 | Refills: 0 | Status: ACTIVE | COMMUNITY
Start: 1900-01-01

## 2021-03-24 RX ORDER — CHOLESTYRAMINE 4 G/9G
TAKE 2 SCOOPS (8 GRAM) EACH SCOOP BEING DISSOLVED IN 2 TO 6 OUNCES OF WATER OR NONCARBONATED BEVERAGE BY PO BID POWDER, FOR SUSPENSION ORAL 2
Qty: 30 | Refills: 6

## 2021-03-24 NOTE — HPI-OTHER HISTORIES
History Of Present Illness    2019 Ms. Daphnie Riddle is here for f/u of chronic diarrhea and abdominal pain. She has a very complicated GI history. She has been treated by Dr. Garcia most recently. She had a colon perforation several years ago with subsequent right sided colon resection. She had an ostomy that was able to be reversed. She then had a ventral hernia that was repaired with mesh. She has had lower abdominal pain since. She had a CT scan with a low ventral hernia with a loop of SB. This is non obstructive at this time. She is taking bentyl with some improvement.  She has been having terrible diarrhea multiple times a day since her colon surgery.  She had normal stool studies and labs with a positive t-transglutaminase (tTG) IgG and elevated inflammatory markers. She had a normal EGD and colonoscopy with normal random bx. She is having some improvement with lomotil and cholestryramine. Her reflux is a little worse today. CS  2019 Ms. Riddle is here for f/u of diarrhea and GERD. Her cholestyramine was increased at her last OV. Her bowels are now fairly normal. She can now leave the house without worrying about having an accident. She continues to have some mid abdominal cramping. She is not taking the bentyl. She was started on zantac at her last OV and now her reflux is well controlled. Overall she is feeling better today. CS  2019 Ms. Riddle is here for diarrhea and abdominal pain. Her bowels are fairly normal with questran QID and lomotil prn. She has had to use the lomotil more frequently since her hernia surgery on the . She is in a lot of abdominal pain. This is around her ribs and incisions. She follows up with her surgeon 1/3. She is taking the bentyl 10mg BID. She has not noticed a change but not sure since she had surgery. Her reflux is not controlled on pepcid 20mg BID- worse at night. CS  3/9/2020 Ms. Riddle is here for f/u of abdominal pain and diarrhea. She was last seen in December after her hernia surgery. She saw Dr. Ritter soon after. She had a CT scan that showed inflammation of her colon along an area of a diverticuli. He ordered stool studies and was going to treat for diverticulitis if negative. She could not do the stool studies due to transportation. She was advised to f/u with us. She continues to have mid abdominal pain worse on the right midline. She continues to have diarrhea worse after eating despite choelstryramine and bentyl. She admits to very high stress. Her mother  last week and prior to that her aunt. 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 CS     Daphnie returns for follow up. She is doing ok  but still having pain. She has not had stool studies sent in as she has had three deaths in the family and dealing with children during the COVID issues. She is tolerating symptoms relatively well. Seems to be managing relatively well.  She has a lot of stress with her family in the house during this shelter at home time.  Her symptoms are tolerable but she is needing medications refilled.  She needs refills for lomoil, questran, AMT 10mg, bentyl.  I spent 7 minutes on telephone call as she could not get Televideo appointment set up properly   2020 Ms. Riddle is here for f/u of abdominal pain and diarrhea. She was last seen in April. She was doing ok on her medicaiton regimen of lomotil, questran, AMT, bentyl. She never completed the stool studies. She has had recurrent LUQ pain. She thinks she may have a hernia. Last CT scan showed possible diverticulitis. She has been under a lot of stress and her fiance passed away from a fall at work. She denies any changes in her weight. She denies any bleeding. CS   2020 Ms. Riddle is here for f/u of abdominal pain and diarrhea. At her last OV, she was having a lot of diarrhea and abdominal pain. She was treated for possible diverticulitis with cipro and flagyl. She had an increase in her stress level after the death of her fiance. Her AMT was increased to 25mg. Today, her abdominal pain is about the same. It was getting better while on the antibx and then returned as soon as they were completed. She denies any fevers. Her diarrhea is about the same. She is out of lomotil. When she takes 2 pills at a time this does help. She is also identifying triggers. CS

## 2021-03-24 NOTE — PHYSICAL EXAM GASTROINTESTINAL
Abdomen , soft, lower tender, nondistended , no guarding or rigidity , no masses palpable , normal bowel sounds , Liver and Spleen , no hepatosplenomegaly , liver nontender , spleen not palpable

## 2021-03-24 NOTE — HPI-TODAY'S VISIT:
3/23/2021 Ms. Riddle is here for f/u of abdominal pain and diarrhea. Last year she had diverticulitis and treat with cipro and flagyl. Her pain has returned along with diarrhea. She remains on AMT 25mg, bentyl, questran, and lomotil. She denies any blood in her stool or melena. CS

## 2021-03-25 LAB
BUN/CREATININE RATIO: 15
BUN: 10
CARBON DIOXIDE, TOTAL: 23
CHLORIDE: 103
CREATININE: 0.68
EGFR IF AFRICN AM: 115
EGFR IF NONAFRICN AM: 100
GLUCOSE: 79
POTASSIUM: 4.5
SODIUM: 137

## 2021-04-06 ENCOUNTER — TELEPHONE ENCOUNTER (OUTPATIENT)
Dept: URBAN - METROPOLITAN AREA CLINIC 23 | Facility: CLINIC | Age: 51
End: 2021-04-06

## 2021-04-28 ENCOUNTER — OFFICE VISIT (OUTPATIENT)
Dept: URBAN - NONMETROPOLITAN AREA CLINIC 2 | Facility: CLINIC | Age: 51
End: 2021-04-28
Payer: COMMERCIAL

## 2021-04-28 VITALS
WEIGHT: 246.6 LBS | SYSTOLIC BLOOD PRESSURE: 161 MMHG | DIASTOLIC BLOOD PRESSURE: 101 MMHG | BODY MASS INDEX: 35.3 KG/M2 | HEART RATE: 66 BPM | HEIGHT: 70 IN | TEMPERATURE: 97.4 F

## 2021-04-28 DIAGNOSIS — K21.9 GERD (GASTROESOPHAGEAL REFLUX DISEASE): ICD-10-CM

## 2021-04-28 DIAGNOSIS — R19.7 DIARRHEA: ICD-10-CM

## 2021-04-28 DIAGNOSIS — R93.89 ABNORMAL FINDING ON IMAGING: ICD-10-CM

## 2021-04-28 DIAGNOSIS — R10.9 ABDOMINAL PAIN: ICD-10-CM

## 2021-04-28 PROCEDURE — 99213 OFFICE O/P EST LOW 20 MIN: CPT | Performed by: NURSE PRACTITIONER

## 2021-04-28 RX ORDER — CETIRIZINE HYDROCHLORIDE 10 MG/1
TAKE 1 TABLET (10 MG) BY ORAL ROUTE ONCE DAILY TABLET, FILM COATED ORAL 1
Qty: 0 | Refills: 0 | Status: ACTIVE | COMMUNITY
Start: 1900-01-01

## 2021-04-28 RX ORDER — HYDROXYZINE HYDROCHLORIDE 25 MG/1
TAKE 1 TABLET BY ORAL ROUTE 4 TIMES A DAY TABLET, FILM COATED ORAL
Qty: 0 | Refills: 0 | Status: ACTIVE | COMMUNITY
Start: 1900-01-01

## 2021-04-28 RX ORDER — FUROSEMIDE 40 MG/1
TAKE 1 TABLET (40 MG) BY ORAL ROUTE 2 TIMES PER DAY TABLET ORAL 2
Qty: 0 | Refills: 0 | Status: ACTIVE | COMMUNITY
Start: 1900-01-01

## 2021-04-28 RX ORDER — HYDROCODONE BITARTRATE AND ACETAMINOPHEN 10; 325 MG/1; MG/1
TAKE 1 TABLET BY ORAL ROUTE EVERY 6 HOURS AS NEEDED FOR PAIN TABLET ORAL
Qty: 0 | Refills: 0 | Status: ACTIVE | COMMUNITY
Start: 1900-01-01

## 2021-04-28 RX ORDER — GABAPENTIN 300 MG/1
TAKE 1 CAPSULE (300 MG) BY ORAL ROUTE 3 TIMES PER DAY CAPSULE ORAL
Qty: 0 | Refills: 0 | Status: ACTIVE | COMMUNITY
Start: 1900-01-01

## 2021-04-28 RX ORDER — ALBUTEROL SULFATE 108 UG/1
INHALE 1 PUFF (90 MCG) BY INHALATION ROUTE EVERY 6 HOURS AS NEEDED AEROSOL, METERED RESPIRATORY (INHALATION)
Qty: 1 | Refills: 0 | Status: ACTIVE | COMMUNITY
Start: 1900-01-01

## 2021-04-28 RX ORDER — OXYBUTYNIN CHLORIDE 5 MG/1
TAKE 1 TABLET BY ORAL ROUTE DAILY TABLET ORAL 1
Qty: 0 | Refills: 0 | Status: ACTIVE | COMMUNITY
Start: 1900-01-01

## 2021-04-28 RX ORDER — TIZANIDINE HYDROCHLORIDE 4 MG/1
TAKE 1 CAPSULE (4 MG) BY ORAL ROUTE 3 TIMES PER DAY CAPSULE ORAL
Qty: 0 | Refills: 0 | Status: ACTIVE | COMMUNITY
Start: 1900-01-01

## 2021-04-28 RX ORDER — LORAZEPAM 1 MG/1
TAKE 1 TABLET BY ORAL ROUTE 2 TIMES A DAY AS NEEDED TABLET ORAL 2
Qty: 0 | Refills: 0 | Status: ACTIVE | COMMUNITY
Start: 1900-01-01

## 2021-04-28 RX ORDER — ZOLPIDEM TARTRATE 10 MG/1
TAKE 1 TABLET (10 MG) BY ORAL ROUTE ONCE DAILY AT BEDTIME TABLET, FILM COATED ORAL 1
Qty: 0 | Refills: 0 | Status: ACTIVE | COMMUNITY
Start: 1900-01-01

## 2021-04-28 RX ORDER — KETOCONAZOLE 20 MG/G
APPLY TO THE AFFECTED AREA(S) BY TOPICAL ROUTE ONCE DAILY CREAM TOPICAL 1
Qty: 1 | Refills: 0 | Status: ACTIVE | COMMUNITY
Start: 1900-01-01

## 2021-04-28 RX ORDER — DICYCLOMINE HYDROCHLORIDE 10 MG/1
TAKE 1 CAPSULE (10 MG) BY ORAL ROUTE 3 TIMES PER DAY FOR 30 DAYS CAPSULE ORAL
Qty: 90 | Refills: 6 | Status: ACTIVE | COMMUNITY

## 2021-04-28 RX ORDER — AMITRIPTYLINE HYDROCHLORIDE 25 MG/1
1 TABLET AT BEDTIME TABLET, FILM COATED ORAL ONCE A DAY
Qty: 30 TABLET | Refills: 11 | Status: ACTIVE | COMMUNITY

## 2021-04-28 RX ORDER — CHOLESTYRAMINE 4 G/9G
TAKE 2 SCOOPS (8 GRAM) EACH SCOOP BEING DISSOLVED IN 2 TO 6 OUNCES OF WATER OR NONCARBONATED BEVERAGE BY PO BID POWDER, FOR SUSPENSION ORAL 2
Qty: 30 | Refills: 6 | Status: ACTIVE | COMMUNITY

## 2021-04-28 RX ORDER — POTASSIUM CHLORIDE 1500 MG/1
TAKE 1 TABLET (20 MEQ) BY ORAL ROUTE ONCE DAILY WITH FOOD TABLET, FILM COATED, EXTENDED RELEASE ORAL 1
Qty: 0 | Refills: 0 | Status: ACTIVE | COMMUNITY
Start: 1900-01-01

## 2021-04-28 RX ORDER — CHOLESTYRAMINE 4 G/9G
TAKE 2 SCOOPS (8 GRAM) EACH SCOOP BEING DISSOLVED IN 2 TO 6 OUNCES OF WATER OR NONCARBONATED BEVERAGE BY PO BID POWDER, FOR SUSPENSION ORAL 2
Qty: 30 | Refills: 6

## 2021-04-28 RX ORDER — OMEPRAZOLE 20 MG/1
TAKE 1 CAPSULE (20 MG) BY ORAL ROUTE ONCE DAILY BEFORE A MEAL CAPSULE, DELAYED RELEASE ORAL 1
Qty: 0 | Refills: 0 | Status: ACTIVE | COMMUNITY
Start: 1900-01-01

## 2021-04-28 RX ORDER — PROPRANOLOL HYDROCHLORIDE 80 MG/1
TAKE 1 TABLET BY ORAL ROUTE DAILY TABLET ORAL 1
Qty: 0 | Refills: 0 | Status: ACTIVE | COMMUNITY
Start: 1900-01-01

## 2021-04-28 RX ORDER — DIPHENOXYLATE HYDROCHLORIDE AND ATROPINE SULFATE 2.5; .025 MG/1; MG/1
TAKE 1-2 TABLETS BY ORAL ROUTE 4 TIMES A DAY TABLET ORAL
Refills: 6
End: 2021-11-23

## 2021-04-28 RX ORDER — DICYCLOMINE HYDROCHLORIDE 10 MG/1
TAKE 1 CAPSULE (10 MG) BY ORAL ROUTE 3 TIMES PER DAY FOR 30 DAYS CAPSULE ORAL
Qty: 90 | Refills: 6

## 2021-04-28 RX ORDER — HYDRALAZINE HYDROCHLORIDE 50 MG/1
TAKE 1 TABLET BY ORAL ROUTE 3 TIMES A DAY TABLET ORAL
Qty: 0 | Refills: 0 | Status: ACTIVE | COMMUNITY
Start: 1900-01-01

## 2021-04-28 RX ORDER — PROMETHAZINE HYDROCHLORIDE 25 MG/1
TAKE 1 TABLET (25 MG) BY ORAL ROUTE EVERY 4-6 HOURS AS NEEDED TABLET ORAL
Qty: 0 | Refills: 0 | Status: ACTIVE | COMMUNITY
Start: 1900-01-01

## 2021-04-28 RX ORDER — PROMETHAZINE HYDROCHLORIDE 6.25 MG/5ML
TAKE 5 MILLILITERS BY ORAL ROUTE 3 TIMES A DAY AS NEEDED SOLUTION ORAL
Qty: 0 | Refills: 0 | Status: ACTIVE | COMMUNITY
Start: 1900-01-01

## 2021-04-28 RX ORDER — HALOBETASOL PROPIONATE 0.5 MG/G
APPLY A THIN LAYER TO THE AFFECTED AREA(S) BY TOPICAL ROUTE ONCE DAILY CREAM TOPICAL 1
Qty: 1 | Refills: 0 | Status: ACTIVE | COMMUNITY
Start: 1900-01-01

## 2021-04-28 RX ORDER — DIPHENOXYLATE HYDROCHLORIDE AND ATROPINE SULFATE 2.5; .025 MG/1; MG/1
TAKE 1 TABLET BY ORAL ROUTE 4 TIMES A DAY TABLET ORAL
Qty: 120 | Refills: 6 | Status: ACTIVE | COMMUNITY

## 2021-04-28 RX ORDER — AMITRIPTYLINE HYDROCHLORIDE 25 MG/1
1 TABLET AT BEDTIME TABLET, FILM COATED ORAL ONCE A DAY
Qty: 30 TABLET | Refills: 11

## 2021-04-28 NOTE — HPI-OTHER HISTORIES
History Of Present Illness    2019 Ms. Daphnie Riddle is here for f/u of chronic diarrhea and abdominal pain. She has a very complicated GI history. She has been treated by Dr. Garcia most recently. She had a colon perforation several years ago with subsequent right sided colon resection. She had an ostomy that was able to be reversed. She then had a ventral hernia that was repaired with mesh. She has had lower abdominal pain since. She had a CT scan with a low ventral hernia with a loop of SB. This is non obstructive at this time. She is taking bentyl with some improvement.  She has been having terrible diarrhea multiple times a day since her colon surgery.  She had normal stool studies and labs with a positive t-transglutaminase (tTG) IgG and elevated inflammatory markers. She had a normal EGD and colonoscopy with normal random bx. She is having some improvement with lomotil and cholestryramine. Her reflux is a little worse today. CS  2019 Ms. Riddle is here for f/u of diarrhea and GERD. Her cholestyramine was increased at her last OV. Her bowels are now fairly normal. She can now leave the house without worrying about having an accident. She continues to have some mid abdominal cramping. She is not taking the bentyl. She was started on zantac at her last OV and now her reflux is well controlled. Overall she is feeling better today. CS  2019 Ms. Riddle is here for diarrhea and abdominal pain. Her bowels are fairly normal with questran QID and lomotil prn. She has had to use the lomotil more frequently since her hernia surgery on the . She is in a lot of abdominal pain. This is around her ribs and incisions. She follows up with her surgeon 1/3. She is taking the bentyl 10mg BID. She has not noticed a change but not sure since she had surgery. Her reflux is not controlled on pepcid 20mg BID- worse at night. CS  3/9/2020 Ms. Riddle is here for f/u of abdominal pain and diarrhea. She was last seen in December after her hernia surgery. She saw Dr. Ritter soon after. She had a CT scan that showed inflammation of her colon along an area of a diverticuli. He ordered stool studies and was going to treat for diverticulitis if negative. She could not do the stool studies due to transportation. She was advised to f/u with us. She continues to have mid abdominal pain worse on the right midline. She continues to have diarrhea worse after eating despite choelstryramine and bentyl. She admits to very high stress. Her mother  last week and prior to that her aunt. 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 CS     Daphnie returns for follow up. She is doing ok  but still having pain. She has not had stool studies sent in as she has had three deaths in the family and dealing with children during the COVID issues. She is tolerating symptoms relatively well. Seems to be managing relatively well.  She has a lot of stress with her family in the house during this shelter at home time.  Her symptoms are tolerable but she is needing medications refilled.  She needs refills for lomoil, questran, AMT 10mg, bentyl.  I spent 7 minutes on telephone call as she could not get Tele[facility] appointment set up properly   2020 Ms. Riddle is here for f/u of abdominal pain and diarrhea. She was last seen in April. She was doing ok on her medicaiton regimen of lomotil, questran, AMT, bentyl. She never completed the stool studies. She has had recurrent LUQ pain. She thinks she may have a hernia. Last CT scan showed possible diverticulitis. She has been under a lot of stress and her fiance passed away from a fall at work. She denies any changes in her weight. She denies any bleeding. CS   2020 Ms. Riddle is here for f/u of abdominal pain and diarrhea. At her last OV, she was having a lot of diarrhea and abdominal pain. She was treated for possible diverticulitis with cipro and flagyl. She had an increase in her stress level after the death of her fiance. Her AMT was increased to 25mg. Today, her abdominal pain is about the same. It was getting better while on the antibx and then returned as soon as they were completed. She denies any fevers. Her diarrhea is about the same. She is out of lomotil. When she takes 2 pills at a time this does help. She is also identifying triggers. CS   3/23/2021 Ms. Riddle is here for f/u of abdominal pain and diarrhea. Last year she had diverticulitis and treat with cipro and flagyl. Her pain has returned along with diarrhea. She remains on AMT 25mg, bentyl, questran, and lomotil. She denies any blood in her stool or melena. CS

## 2021-04-28 NOTE — HPI-TODAY'S VISIT:
4/28/2021 Ms. Riddle is here for f/u of abdominal pain and diarrhea. Last year she had diverticulitis and treat with cipro and flagyl. Her pain has returned along with diarrhea. She remains on AMT 25mg, bentyl, questran, and lomotil. She had a CT scan to r/o recurrent diveriticulitis. This was negative but it did show a verntal hernia with SB. Today, she reports the flagyl did not help. She continues to have pain and diarrhea. She is needed about 8 lomotil a day and is running out. She does not think BID questran is helping any longer. CS

## 2021-07-28 ENCOUNTER — OFFICE VISIT (OUTPATIENT)
Dept: URBAN - NONMETROPOLITAN AREA CLINIC 2 | Facility: CLINIC | Age: 51
End: 2021-07-28

## 2021-08-26 ENCOUNTER — WEB ENCOUNTER (OUTPATIENT)
Dept: URBAN - NONMETROPOLITAN AREA CLINIC 13 | Facility: CLINIC | Age: 51
End: 2021-08-26

## 2021-08-26 ENCOUNTER — OFFICE VISIT (OUTPATIENT)
Dept: URBAN - NONMETROPOLITAN AREA CLINIC 13 | Facility: CLINIC | Age: 51
End: 2021-08-26
Payer: COMMERCIAL

## 2021-08-26 DIAGNOSIS — K21.9 GERD (GASTROESOPHAGEAL REFLUX DISEASE): ICD-10-CM

## 2021-08-26 DIAGNOSIS — R19.7 DIARRHEA: ICD-10-CM

## 2021-08-26 DIAGNOSIS — R10.84 ABDOMINAL CRAMPING, GENERALIZED: ICD-10-CM

## 2021-08-26 PROBLEM — 408574004 IMAGING RESULT ABNORMAL: Status: ACTIVE | Noted: 2021-03-24

## 2021-08-26 PROCEDURE — 99213 OFFICE O/P EST LOW 20 MIN: CPT | Performed by: NURSE PRACTITIONER

## 2021-08-26 RX ORDER — ZOLPIDEM TARTRATE 10 MG/1
TAKE 1 TABLET (10 MG) BY ORAL ROUTE ONCE DAILY AT BEDTIME TABLET, FILM COATED ORAL 1
Qty: 0 | Refills: 0 | COMMUNITY
Start: 1900-01-01

## 2021-08-26 RX ORDER — DICYCLOMINE HYDROCHLORIDE 10 MG/1
TAKE 1 CAPSULE (10 MG) BY ORAL ROUTE 3 TIMES PER DAY FOR 30 DAYS CAPSULE ORAL
Qty: 90 | Refills: 6 | COMMUNITY

## 2021-08-26 RX ORDER — LORAZEPAM 1 MG/1
TAKE 1 TABLET BY ORAL ROUTE 2 TIMES A DAY AS NEEDED TABLET ORAL 2
Qty: 0 | Refills: 0 | COMMUNITY
Start: 1900-01-01

## 2021-08-26 RX ORDER — POTASSIUM CHLORIDE 1500 MG/1
TAKE 1 TABLET (20 MEQ) BY ORAL ROUTE ONCE DAILY WITH FOOD TABLET, FILM COATED, EXTENDED RELEASE ORAL 1
Qty: 0 | Refills: 0 | COMMUNITY
Start: 1900-01-01

## 2021-08-26 RX ORDER — ALBUTEROL SULFATE 108 UG/1
INHALE 1 PUFF (90 MCG) BY INHALATION ROUTE EVERY 6 HOURS AS NEEDED AEROSOL, METERED RESPIRATORY (INHALATION)
Qty: 1 | Refills: 0 | COMMUNITY
Start: 1900-01-01

## 2021-08-26 RX ORDER — HYDROCODONE BITARTRATE AND ACETAMINOPHEN 10; 325 MG/1; MG/1
TAKE 1 TABLET BY ORAL ROUTE EVERY 6 HOURS AS NEEDED FOR PAIN TABLET ORAL
Qty: 0 | Refills: 0 | COMMUNITY
Start: 1900-01-01

## 2021-08-26 RX ORDER — HYDRALAZINE HYDROCHLORIDE 50 MG/1
TAKE 1 TABLET BY ORAL ROUTE 3 TIMES A DAY TABLET ORAL
Qty: 0 | Refills: 0 | COMMUNITY
Start: 1900-01-01

## 2021-08-26 RX ORDER — CETIRIZINE HYDROCHLORIDE 10 MG/1
TAKE 1 TABLET (10 MG) BY ORAL ROUTE ONCE DAILY TABLET, FILM COATED ORAL 1
Qty: 0 | Refills: 0 | COMMUNITY
Start: 1900-01-01

## 2021-08-26 RX ORDER — HALOBETASOL PROPIONATE 0.5 MG/G
APPLY A THIN LAYER TO THE AFFECTED AREA(S) BY TOPICAL ROUTE ONCE DAILY CREAM TOPICAL 1
Qty: 1 | Refills: 0 | COMMUNITY
Start: 1900-01-01

## 2021-08-26 RX ORDER — DICYCLOMINE HYDROCHLORIDE 10 MG/1
TAKE 1 CAPSULE (10 MG) BY ORAL ROUTE 3 TIMES PER DAY FOR 30 DAYS CAPSULE ORAL
Qty: 90 | Refills: 6

## 2021-08-26 RX ORDER — PROMETHAZINE HYDROCHLORIDE 6.25 MG/5ML
TAKE 5 MILLILITERS BY ORAL ROUTE 3 TIMES A DAY AS NEEDED SOLUTION ORAL
Qty: 0 | Refills: 0 | COMMUNITY
Start: 1900-01-01

## 2021-08-26 RX ORDER — PROMETHAZINE HYDROCHLORIDE 25 MG/1
TAKE 1 TABLET (25 MG) BY ORAL ROUTE EVERY 4-6 HOURS AS NEEDED TABLET ORAL
Qty: 0 | Refills: 0 | COMMUNITY
Start: 1900-01-01

## 2021-08-26 RX ORDER — TIZANIDINE HYDROCHLORIDE 4 MG/1
TAKE 1 CAPSULE (4 MG) BY ORAL ROUTE 3 TIMES PER DAY CAPSULE ORAL
Qty: 0 | Refills: 0 | COMMUNITY
Start: 1900-01-01

## 2021-08-26 RX ORDER — GABAPENTIN 300 MG/1
TAKE 1 CAPSULE (300 MG) BY ORAL ROUTE 3 TIMES PER DAY CAPSULE ORAL
Qty: 0 | Refills: 0 | COMMUNITY
Start: 1900-01-01

## 2021-08-26 RX ORDER — DIPHENOXYLATE HYDROCHLORIDE AND ATROPINE SULFATE 2.5; .025 MG/1; MG/1
TAKE 1-2 TABLETS BY ORAL ROUTE 4 TIMES A DAY TABLET ORAL
Refills: 6

## 2021-08-26 RX ORDER — HYDROXYZINE HYDROCHLORIDE 25 MG/1
TAKE 1 TABLET BY ORAL ROUTE 4 TIMES A DAY TABLET, FILM COATED ORAL
Qty: 0 | Refills: 0 | COMMUNITY
Start: 1900-01-01

## 2021-08-26 RX ORDER — AMITRIPTYLINE HYDROCHLORIDE 25 MG/1
1 TABLET AT BEDTIME TABLET, FILM COATED ORAL ONCE A DAY
Qty: 30 TABLET | Refills: 11 | COMMUNITY

## 2021-08-26 RX ORDER — PROPRANOLOL HYDROCHLORIDE 80 MG/1
TAKE 1 TABLET BY ORAL ROUTE DAILY TABLET ORAL 1
Qty: 0 | Refills: 0 | COMMUNITY
Start: 1900-01-01

## 2021-08-26 RX ORDER — OXYBUTYNIN CHLORIDE 5 MG/1
TAKE 1 TABLET BY ORAL ROUTE DAILY TABLET ORAL 1
Qty: 0 | Refills: 0 | COMMUNITY
Start: 1900-01-01

## 2021-08-26 RX ORDER — CHOLESTYRAMINE 4 G/9G
TAKE 2 SCOOPS (8 GRAM) EACH SCOOP BEING DISSOLVED IN 2 TO 6 OUNCES OF WATER OR NONCARBONATED BEVERAGE BY PO BID POWDER, FOR SUSPENSION ORAL 2
Qty: 30 | Refills: 6

## 2021-08-26 RX ORDER — KETOCONAZOLE 20 MG/G
APPLY TO THE AFFECTED AREA(S) BY TOPICAL ROUTE ONCE DAILY CREAM TOPICAL 1
Qty: 1 | Refills: 0 | COMMUNITY
Start: 1900-01-01

## 2021-08-26 RX ORDER — FUROSEMIDE 40 MG/1
TAKE 1 TABLET (40 MG) BY ORAL ROUTE 2 TIMES PER DAY TABLET ORAL 2
Qty: 0 | Refills: 0 | COMMUNITY
Start: 1900-01-01

## 2021-08-26 RX ORDER — AMITRIPTYLINE HYDROCHLORIDE 25 MG/1
1 TABLET AT BEDTIME TABLET, FILM COATED ORAL ONCE A DAY
Qty: 30 TABLET | Refills: 11

## 2021-08-26 RX ORDER — CHOLESTYRAMINE 4 G/9G
TAKE 2 SCOOPS (8 GRAM) EACH SCOOP BEING DISSOLVED IN 2 TO 6 OUNCES OF WATER OR NONCARBONATED BEVERAGE BY PO BID POWDER, FOR SUSPENSION ORAL 2
Qty: 30 | Refills: 6 | COMMUNITY

## 2021-08-26 RX ORDER — OMEPRAZOLE 20 MG/1
TAKE 1 CAPSULE (20 MG) BY ORAL ROUTE ONCE DAILY BEFORE A MEAL CAPSULE, DELAYED RELEASE ORAL 1
Qty: 0 | Refills: 0 | COMMUNITY
Start: 1900-01-01

## 2021-08-26 RX ORDER — DIPHENOXYLATE HYDROCHLORIDE AND ATROPINE SULFATE 2.5; .025 MG/1; MG/1
TAKE 1-2 TABLETS BY ORAL ROUTE 4 TIMES A DAY TABLET ORAL
Refills: 6 | COMMUNITY
End: 2021-11-23

## 2021-08-26 NOTE — HPI-OTHER HISTORIES
History Of Present Illness    2019 Ms. Daphnie Riddle is here for f/u of chronic diarrhea and abdominal pain. She has a very complicated GI history. She has been treated by Dr. Garcia most recently. She had a colon perforation several years ago with subsequent right sided colon resection. She had an ostomy that was able to be reversed. She then had a ventral hernia that was repaired with mesh. She has had lower abdominal pain since. She had a CT scan with a low ventral hernia with a loop of SB. This is non obstructive at this time. She is taking bentyl with some improvement.  She has been having terrible diarrhea multiple times a day since her colon surgery.  She had normal stool studies and labs with a positive t-transglutaminase (tTG) IgG and elevated inflammatory markers. She had a normal EGD and colonoscopy with normal random bx. She is having some improvement with lomotil and cholestryramine. Her reflux is a little worse today. CS  2019 Ms. Riddle is here for f/u of diarrhea and GERD. Her cholestyramine was increased at her last OV. Her bowels are now fairly normal. She can now leave the house without worrying about having an accident. She continues to have some mid abdominal cramping. She is not taking the bentyl. She was started on zantac at her last OV and now her reflux is well controlled. Overall she is feeling better today. CS  2019 Ms. Riddle is here for diarrhea and abdominal pain. Her bowels are fairly normal with questran QID and lomotil prn. She has had to use the lomotil more frequently since her hernia surgery on the . She is in a lot of abdominal pain. This is around her ribs and incisions. She follows up with her surgeon 1/3. She is taking the bentyl 10mg BID. She has not noticed a change but not sure since she had surgery. Her reflux is not controlled on pepcid 20mg BID- worse at night. CS  3/9/2020 Ms. Riddle is here for f/u of abdominal pain and diarrhea. She was last seen in December after her hernia surgery. She saw Dr. Ritter soon after. She had a CT scan that showed inflammation of her colon along an area of a diverticuli. He ordered stool studies and was going to treat for diverticulitis if negative. She could not do the stool studies due to transportation. She was advised to f/u with us. She continues to have mid abdominal pain worse on the right midline. She continues to have diarrhea worse after eating despite choelstryramine and bentyl. She admits to very high stress. Her mother  last week and prior to that her aunt. 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 CS     Daphnie returns for follow up. She is doing ok  but still having pain. She has not had stool studies sent in as she has had three deaths in the family and dealing with children during the COVID issues. She is tolerating symptoms relatively well. Seems to be managing relatively well.  She has a lot of stress with her family in the house during this shelter at home time.  Her symptoms are tolerable but she is needing medications refilled.  She needs refills for lomoil, questran, AMT 10mg, bentyl.  I spent 7 minutes on telephone call as she could not get Tele[facility] appointment set up properly   2020 Ms. Riddle is here for f/u of abdominal pain and diarrhea. She was last seen in April. She was doing ok on her medicaiton regimen of lomotil, questran, AMT, bentyl. She never completed the stool studies. She has had recurrent LUQ pain. She thinks she may have a hernia. Last CT scan showed possible diverticulitis. She has been under a lot of stress and her fiance passed away from a fall at work. She denies any changes in her weight. She denies any bleeding. CS   2020 Ms. Riddle is here for f/u of abdominal pain and diarrhea. At her last OV, she was having a lot of diarrhea and abdominal pain. She was treated for possible diverticulitis with cipro and flagyl. She had an increase in her stress level after the death of her fiance. Her AMT was increased to 25mg. Today, her abdominal pain is about the same. It was getting better while on the antibx and then returned as soon as they were completed. She denies any fevers. Her diarrhea is about the same. She is out of lomotil. When she takes 2 pills at a time this does help. She is also identifying triggers. CS   3/23/2021 Ms. Riddle is here for f/u of abdominal pain and diarrhea. Last year she had diverticulitis and treat with cipro and flagyl. Her pain has returned along with diarrhea. She remains on AMT 25mg, bentyl, questran, and lomotil. She denies any blood in her stool or melena. CS   2021 Ms. Riddle is here for f/u of abdominal pain and diarrhea. Last year she had diverticulitis and treat with cipro and flagyl. Her pain has returned along with diarrhea. She remains on AMT 25mg, bentyl, questran, and lomotil. She had a CT scan to r/o recurrent diveriticulitis. This was negative but it did show a verntal hernia with SB. Today, she reports the flagyl did not help. She continues to have pain and diarrhea. She is needed about 8 lomotil a day and is running out. She does not think BID questran is helping any longer. CS

## 2021-08-26 NOTE — HPI-TODAY'S VISIT:
8/26/2021 Ms. Riddle is here for f/u of abdominal pain and diarrhea. She has had recurrent abdominal pain. Her CT showed a ventral hernia with SB. She was referred to Dr Seaman and had surgery 7/7/2021. She has some drainage and slow incisional healing. She continues to have pain but this is slowly getting better. She is watching her diet.  She remains on AMT 25mg, bentyl, questran, and lomotil. Her diarrhea is fairly well controlled as long as she sticks to her medication schedule and watches for trigger foods. CS

## 2021-11-18 ENCOUNTER — OFFICE VISIT (OUTPATIENT)
Dept: URBAN - NONMETROPOLITAN AREA CLINIC 13 | Facility: CLINIC | Age: 51
End: 2021-11-18

## 2021-11-23 ENCOUNTER — TELEPHONE ENCOUNTER (OUTPATIENT)
Dept: URBAN - NONMETROPOLITAN AREA CLINIC 2 | Facility: CLINIC | Age: 51
End: 2021-11-23

## 2021-12-17 ENCOUNTER — OFFICE VISIT (OUTPATIENT)
Dept: URBAN - METROPOLITAN AREA TELEHEALTH 2 | Facility: TELEHEALTH | Age: 51
End: 2021-12-17
Payer: COMMERCIAL

## 2021-12-17 ENCOUNTER — TELEPHONE ENCOUNTER (OUTPATIENT)
Dept: URBAN - METROPOLITAN AREA CLINIC 92 | Facility: CLINIC | Age: 51
End: 2021-12-17

## 2021-12-17 ENCOUNTER — TELEPHONE ENCOUNTER (OUTPATIENT)
Dept: URBAN - NONMETROPOLITAN AREA CLINIC 2 | Facility: CLINIC | Age: 51
End: 2021-12-17

## 2021-12-17 DIAGNOSIS — R10.84 ABDOMINAL PAIN, GENERALIZED: ICD-10-CM

## 2021-12-17 DIAGNOSIS — R19.7 DIARRHEA: ICD-10-CM

## 2021-12-17 DIAGNOSIS — K21.9 GERD (GASTROESOPHAGEAL REFLUX DISEASE): ICD-10-CM

## 2021-12-17 PROCEDURE — 99214 OFFICE O/P EST MOD 30 MIN: CPT | Performed by: NURSE PRACTITIONER

## 2021-12-17 RX ORDER — CETIRIZINE HYDROCHLORIDE 10 MG/1
TAKE 1 TABLET (10 MG) BY ORAL ROUTE ONCE DAILY TABLET, FILM COATED ORAL 1
Qty: 0 | Refills: 0 | COMMUNITY
Start: 1900-01-01

## 2021-12-17 RX ORDER — LORAZEPAM 1 MG/1
TAKE 1 TABLET BY ORAL ROUTE 2 TIMES A DAY AS NEEDED TABLET ORAL 2
Qty: 0 | Refills: 0 | COMMUNITY
Start: 1900-01-01

## 2021-12-17 RX ORDER — OMEPRAZOLE 20 MG/1
TAKE 1 CAPSULE (20 MG) BY ORAL ROUTE ONCE DAILY BEFORE A MEAL CAPSULE, DELAYED RELEASE ORAL 1
Qty: 0 | Refills: 0 | COMMUNITY
Start: 1900-01-01

## 2021-12-17 RX ORDER — CHOLESTYRAMINE 4 G/9G
TAKE 2 SCOOPS (8 GRAM) EACH SCOOP BEING DISSOLVED IN 2 TO 6 OUNCES OF WATER OR NONCARBONATED BEVERAGE BY PO BID POWDER, FOR SUSPENSION ORAL 2
Qty: 30 | Refills: 6 | COMMUNITY

## 2021-12-17 RX ORDER — DICYCLOMINE HYDROCHLORIDE 10 MG/1
TAKE 1 CAPSULE (10 MG) BY ORAL ROUTE 3 TIMES PER DAY FOR 30 DAYS CAPSULE ORAL
Qty: 90 | Refills: 6 | COMMUNITY

## 2021-12-17 RX ORDER — AMITRIPTYLINE HYDROCHLORIDE 25 MG/1
1 TABLET AT BEDTIME TABLET, FILM COATED ORAL ONCE A DAY
Qty: 30 TABLET | Refills: 11

## 2021-12-17 RX ORDER — PROMETHAZINE HYDROCHLORIDE 6.25 MG/5ML
TAKE 5 MILLILITERS BY ORAL ROUTE 3 TIMES A DAY AS NEEDED SOLUTION ORAL
Qty: 0 | Refills: 0 | COMMUNITY
Start: 1900-01-01

## 2021-12-17 RX ORDER — METRONIDAZOLE 500 MG/1
1 TABLET TABLET, FILM COATED ORAL THREE TIMES A DAY
Qty: 42 TABLET | Refills: 0 | OUTPATIENT
Start: 2021-12-17 | End: 2021-12-31

## 2021-12-17 RX ORDER — CHOLESTYRAMINE 4 G/9G
TAKE 2 SCOOPS (8 GRAM) EACH SCOOP BEING DISSOLVED IN 2 TO 6 OUNCES OF WATER OR NONCARBONATED BEVERAGE BY PO BID POWDER, FOR SUSPENSION ORAL 2
Qty: 30 | Refills: 6

## 2021-12-17 RX ORDER — FUROSEMIDE 40 MG/1
TAKE 1 TABLET (40 MG) BY ORAL ROUTE 2 TIMES PER DAY TABLET ORAL 2
Qty: 0 | Refills: 0 | COMMUNITY
Start: 1900-01-01

## 2021-12-17 RX ORDER — DICYCLOMINE HYDROCHLORIDE 10 MG/1
TAKE 1 CAPSULE (10 MG) BY ORAL ROUTE 3 TIMES PER DAY FOR 30 DAYS CAPSULE ORAL
Qty: 90 | Refills: 6

## 2021-12-17 RX ORDER — AMITRIPTYLINE HYDROCHLORIDE 25 MG/1
1 TABLET AT BEDTIME TABLET, FILM COATED ORAL ONCE A DAY
Qty: 30 TABLET | Refills: 11 | COMMUNITY

## 2021-12-17 RX ORDER — OXYBUTYNIN CHLORIDE 5 MG/1
TAKE 1 TABLET BY ORAL ROUTE DAILY TABLET ORAL 1
Qty: 0 | Refills: 0 | COMMUNITY
Start: 1900-01-01

## 2021-12-17 RX ORDER — ZOLPIDEM TARTRATE 10 MG/1
TAKE 1 TABLET (10 MG) BY ORAL ROUTE ONCE DAILY AT BEDTIME TABLET, FILM COATED ORAL 1
Qty: 0 | Refills: 0 | COMMUNITY
Start: 1900-01-01

## 2021-12-17 RX ORDER — HALOBETASOL PROPIONATE 0.5 MG/G
APPLY A THIN LAYER TO THE AFFECTED AREA(S) BY TOPICAL ROUTE ONCE DAILY CREAM TOPICAL 1
Qty: 1 | Refills: 0 | COMMUNITY
Start: 1900-01-01

## 2021-12-17 RX ORDER — HYDROXYZINE HYDROCHLORIDE 25 MG/1
TAKE 1 TABLET BY ORAL ROUTE 4 TIMES A DAY TABLET, FILM COATED ORAL
Qty: 0 | Refills: 0 | COMMUNITY
Start: 1900-01-01

## 2021-12-17 RX ORDER — ALBUTEROL SULFATE 108 UG/1
INHALE 1 PUFF (90 MCG) BY INHALATION ROUTE EVERY 6 HOURS AS NEEDED AEROSOL, METERED RESPIRATORY (INHALATION)
Qty: 1 | Refills: 0 | COMMUNITY
Start: 1900-01-01

## 2021-12-17 RX ORDER — HYDRALAZINE HYDROCHLORIDE 50 MG/1
TAKE 1 TABLET BY ORAL ROUTE 3 TIMES A DAY TABLET ORAL
Qty: 0 | Refills: 0 | COMMUNITY
Start: 1900-01-01

## 2021-12-17 RX ORDER — TIZANIDINE HYDROCHLORIDE 4 MG/1
TAKE 1 CAPSULE (4 MG) BY ORAL ROUTE 3 TIMES PER DAY CAPSULE ORAL
Qty: 0 | Refills: 0 | COMMUNITY
Start: 1900-01-01

## 2021-12-17 RX ORDER — GABAPENTIN 300 MG/1
TAKE 1 CAPSULE (300 MG) BY ORAL ROUTE 3 TIMES PER DAY CAPSULE ORAL
Qty: 0 | Refills: 0 | COMMUNITY
Start: 1900-01-01

## 2021-12-17 RX ORDER — DIFENOXIN AND ATROPINE SULFATE .025; 1 MG/1; MG/1
1 TABLET AS NEEDED TABLET ORAL
Qty: 120 TABLET | Refills: 3 | OUTPATIENT

## 2021-12-17 RX ORDER — PROPRANOLOL HYDROCHLORIDE 80 MG/1
TAKE 1 TABLET BY ORAL ROUTE DAILY TABLET ORAL 1
Qty: 0 | Refills: 0 | COMMUNITY
Start: 1900-01-01

## 2021-12-17 RX ORDER — DIPHENOXYLATE HYDROCHLORIDE AND ATROPINE SULFATE 2.5; .025 MG/1; MG/1
TAKE 1-2 TABLETS BY ORAL ROUTE 4 TIMES A DAY TABLET ORAL
Refills: 6 | COMMUNITY

## 2021-12-17 RX ORDER — HYDROCODONE BITARTRATE AND ACETAMINOPHEN 10; 325 MG/1; MG/1
TAKE 1 TABLET BY ORAL ROUTE EVERY 6 HOURS AS NEEDED FOR PAIN TABLET ORAL
Qty: 0 | Refills: 0 | COMMUNITY
Start: 1900-01-01

## 2021-12-17 RX ORDER — KETOCONAZOLE 20 MG/G
APPLY TO THE AFFECTED AREA(S) BY TOPICAL ROUTE ONCE DAILY CREAM TOPICAL 1
Qty: 1 | Refills: 0 | COMMUNITY
Start: 1900-01-01

## 2021-12-17 RX ORDER — POTASSIUM CHLORIDE 1500 MG/1
TAKE 1 TABLET (20 MEQ) BY ORAL ROUTE ONCE DAILY WITH FOOD TABLET, FILM COATED, EXTENDED RELEASE ORAL 1
Qty: 0 | Refills: 0 | COMMUNITY
Start: 1900-01-01

## 2021-12-17 RX ORDER — PROMETHAZINE HYDROCHLORIDE 25 MG/1
TAKE 1 TABLET (25 MG) BY ORAL ROUTE EVERY 4-6 HOURS AS NEEDED TABLET ORAL
Qty: 0 | Refills: 0 | COMMUNITY
Start: 1900-01-01

## 2021-12-17 NOTE — HPI-OTHER HISTORIES
History Of Present Illness    2019 Ms. Daphnie Riddle is here for f/u of chronic diarrhea and abdominal pain. She has a very complicated GI history. She has been treated by Dr. Garcia most recently. She had a colon perforation several years ago with subsequent right sided colon resection. She had an ostomy that was able to be reversed. She then had a ventral hernia that was repaired with mesh. She has had lower abdominal pain since. She had a CT scan with a low ventral hernia with a loop of SB. This is non obstructive at this time. She is taking bentyl with some improvement.  She has been having terrible diarrhea multiple times a day since her colon surgery.  She had normal stool studies and labs with a positive t-transglutaminase (tTG) IgG and elevated inflammatory markers. She had a normal EGD and colonoscopy with normal random bx. She is having some improvement with lomotil and cholestryramine. Her reflux is a little worse today. CS  2019 Ms. Riddle is here for f/u of diarrhea and GERD. Her cholestyramine was increased at her last OV. Her bowels are now fairly normal. She can now leave the house without worrying about having an accident. She continues to have some mid abdominal cramping. She is not taking the bentyl. She was started on zantac at her last OV and now her reflux is well controlled. Overall she is feeling better today. CS  2019 Ms. Riddle is here for diarrhea and abdominal pain. Her bowels are fairly normal with questran QID and lomotil prn. She has had to use the lomotil more frequently since her hernia surgery on the . She is in a lot of abdominal pain. This is around her ribs and incisions. She follows up with her surgeon 1/3. She is taking the bentyl 10mg BID. She has not noticed a change but not sure since she had surgery. Her reflux is not controlled on pepcid 20mg BID- worse at night. CS  3/9/2020 Ms. Riddle is here for f/u of abdominal pain and diarrhea. She was last seen in December after her hernia surgery. She saw Dr. Ritter soon after. She had a CT scan that showed inflammation of her colon along an area of a diverticuli. He ordered stool studies and was going to treat for diverticulitis if negative. She could not do the stool studies due to transportation. She was advised to f/u with us. She continues to have mid abdominal pain worse on the right midline. She continues to have diarrhea worse after eating despite choelstryramine and bentyl. She admits to very high stress. Her mother  last week and prior to that her aunt. 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 CS     Daphnie returns for follow up. She is doing ok  but still having pain. She has not had stool studies sent in as she has had three deaths in the family and dealing with children during the COVID issues. She is tolerating symptoms relatively well. Seems to be managing relatively well.  She has a lot of stress with her family in the house during this shelter at home time.  Her symptoms are tolerable but she is needing medications refilled.  She needs refills for lomoil, questran, AMT 10mg, bentyl.  I spent 7 minutes on telephone call as she could not get Televideo appointment set up properly   2020 Ms. Riddle is here for f/u of abdominal pain and diarrhea. She was last seen in April. She was doing ok on her medicaiton regimen of lomotil, questran, AMT, bentyl. She never completed the stool studies. She has had recurrent LUQ pain. She thinks she may have a hernia. Last CT scan showed possible diverticulitis. She has been under a lot of stress and her fiance passed away from a fall at work. She denies any changes in her weight. She denies any bleeding. CS   2020 Ms. Riddle is here for f/u of abdominal pain and diarrhea. At her last OV, she was having a lot of diarrhea and abdominal pain. She was treated for possible diverticulitis with cipro and flagyl. She had an increase in her stress level after the death of her fiance. Her AMT was increased to 25mg. Today, her abdominal pain is about the same. It was getting better while on the antibx and then returned as soon as they were completed. She denies any fevers. Her diarrhea is about the same. She is out of lomotil. When she takes 2 pills at a time this does help. She is also identifying triggers. CS   3/23/2021 Ms. Riddle is here for f/u of abdominal pain and diarrhea. Last year she had diverticulitis and treat with cipro and flagyl. Her pain has returned along with diarrhea. She remains on AMT 25mg, bentyl, questran, and lomotil. She denies any blood in her stool or melena. CS   2021 Ms. Riddle is here for f/u of abdominal pain and diarrhea. Last year she had diverticulitis and treat with cipro and flagyl. Her pain has returned along with diarrhea. She remains on AMT 25mg, bentyl, questran, and lomotil. She had a CT scan to r/o recurrent diveriticulitis. This was negative but it did show a verntal hernia with SB. Today, she reports the flagyl did not help. She continues to have pain and diarrhea. She is needed about 8 lomotil a day and is running out. She does not think BID questran is helping any longer. CS   2021 Ms. Riddle is here for f/u of abdominal pain and diarrhea. She has had recurrent abdominal pain. Her CT showed a ventral hernia with SB. She was referred to Dr Seaman and had surgery 2021. She has some drainage and slow incisional healing. She continues to have pain but this is slowly getting better. She is watching her diet.  She remains on AMT 25mg, bentyl, questran, and lomotil. Her diarrhea is fairly well controlled as long as she sticks to her medication schedule and watches for trigger foods. CS

## 2021-12-17 NOTE — HPI-TODAY'S VISIT:
12/17/2021 Ms. Riddle is evaluated via telehealth for f/u of abdominal pain and diarrhea. She had hernia surgey 7/7/2021. Her pain is better.  She remains on AMT 25mg, bentyl, questran, and lomotil. At her last OV, her diarrhea was fairly well controlled with meds and diet. Over the last month, her diarrhea has been worse. She is having to take about 8 lomotil a day with continued symptoms. She feels her stress and grieving during the holidays maybe contributing. CS

## 2022-01-14 ENCOUNTER — OFFICE VISIT (OUTPATIENT)
Dept: URBAN - METROPOLITAN AREA TELEHEALTH 2 | Facility: TELEHEALTH | Age: 52
End: 2022-01-14
Payer: COMMERCIAL

## 2022-01-14 ENCOUNTER — TELEPHONE ENCOUNTER (OUTPATIENT)
Dept: URBAN - METROPOLITAN AREA CLINIC 92 | Facility: CLINIC | Age: 52
End: 2022-01-14

## 2022-01-14 DIAGNOSIS — R19.7 DIARRHEA: ICD-10-CM

## 2022-01-14 DIAGNOSIS — K21.9 GERD (GASTROESOPHAGEAL REFLUX DISEASE): ICD-10-CM

## 2022-01-14 DIAGNOSIS — R10.84 ABDOMINAL CRAMPING, GENERALIZED: ICD-10-CM

## 2022-01-14 PROCEDURE — 99213 OFFICE O/P EST LOW 20 MIN: CPT | Performed by: NURSE PRACTITIONER

## 2022-01-14 RX ORDER — DIFENOXIN AND ATROPINE SULFATE .025; 1 MG/1; MG/1
1 TABLET AS NEEDED TABLET ORAL
Qty: 120 TABLET | Refills: 3 | COMMUNITY

## 2022-01-14 RX ORDER — HYDROXYZINE HYDROCHLORIDE 25 MG/1
TAKE 1 TABLET BY ORAL ROUTE 4 TIMES A DAY TABLET, FILM COATED ORAL
Qty: 0 | Refills: 0 | COMMUNITY
Start: 1900-01-01

## 2022-01-14 RX ORDER — AMITRIPTYLINE HYDROCHLORIDE 25 MG/1
1 TABLET AT BEDTIME TABLET, FILM COATED ORAL ONCE A DAY
Qty: 30 TABLET | Refills: 11

## 2022-01-14 RX ORDER — GABAPENTIN 300 MG/1
TAKE 1 CAPSULE (300 MG) BY ORAL ROUTE 3 TIMES PER DAY CAPSULE ORAL
Qty: 0 | Refills: 0 | COMMUNITY
Start: 1900-01-01

## 2022-01-14 RX ORDER — PROMETHAZINE HYDROCHLORIDE 6.25 MG/5ML
TAKE 5 MILLILITERS BY ORAL ROUTE 3 TIMES A DAY AS NEEDED SOLUTION ORAL
Qty: 0 | Refills: 0 | COMMUNITY
Start: 1900-01-01

## 2022-01-14 RX ORDER — PROPRANOLOL HYDROCHLORIDE 80 MG/1
TAKE 1 TABLET BY ORAL ROUTE DAILY TABLET ORAL 1
Qty: 0 | Refills: 0 | COMMUNITY
Start: 1900-01-01

## 2022-01-14 RX ORDER — HYDROCODONE BITARTRATE AND ACETAMINOPHEN 10; 325 MG/1; MG/1
TAKE 1 TABLET BY ORAL ROUTE EVERY 6 HOURS AS NEEDED FOR PAIN TABLET ORAL
Qty: 0 | Refills: 0 | COMMUNITY
Start: 1900-01-01

## 2022-01-14 RX ORDER — METRONIDAZOLE 500 MG/1
1 TABLET TABLET, FILM COATED ORAL THREE TIMES A DAY
Qty: 42 TABLET | Refills: 3
Start: 2021-12-17 | End: 2022-03-11

## 2022-01-14 RX ORDER — DICYCLOMINE HYDROCHLORIDE 10 MG/1
TAKE 1 CAPSULE (10 MG) BY ORAL ROUTE 3 TIMES PER DAY FOR 30 DAYS CAPSULE ORAL
Qty: 90 | Refills: 6

## 2022-01-14 RX ORDER — OMEPRAZOLE 20 MG/1
TAKE 1 CAPSULE (20 MG) BY ORAL ROUTE ONCE DAILY BEFORE A MEAL CAPSULE, DELAYED RELEASE ORAL 1
Qty: 0 | Refills: 0 | COMMUNITY
Start: 1900-01-01

## 2022-01-14 RX ORDER — FUROSEMIDE 40 MG/1
TAKE 1 TABLET (40 MG) BY ORAL ROUTE 2 TIMES PER DAY TABLET ORAL 2
Qty: 0 | Refills: 0 | COMMUNITY
Start: 1900-01-01

## 2022-01-14 RX ORDER — AMITRIPTYLINE HYDROCHLORIDE 25 MG/1
1 TABLET AT BEDTIME TABLET, FILM COATED ORAL ONCE A DAY
Qty: 30 TABLET | Refills: 11 | COMMUNITY

## 2022-01-14 RX ORDER — DICYCLOMINE HYDROCHLORIDE 10 MG/1
TAKE 1 CAPSULE (10 MG) BY ORAL ROUTE 3 TIMES PER DAY FOR 30 DAYS CAPSULE ORAL
Qty: 90 | Refills: 6 | COMMUNITY

## 2022-01-14 RX ORDER — LORAZEPAM 1 MG/1
TAKE 1 TABLET BY ORAL ROUTE 2 TIMES A DAY AS NEEDED TABLET ORAL 2
Qty: 0 | Refills: 0 | COMMUNITY
Start: 1900-01-01

## 2022-01-14 RX ORDER — HALOBETASOL PROPIONATE 0.5 MG/G
APPLY A THIN LAYER TO THE AFFECTED AREA(S) BY TOPICAL ROUTE ONCE DAILY CREAM TOPICAL 1
Qty: 1 | Refills: 0 | COMMUNITY
Start: 1900-01-01

## 2022-01-14 RX ORDER — TIZANIDINE HYDROCHLORIDE 4 MG/1
TAKE 1 CAPSULE (4 MG) BY ORAL ROUTE 3 TIMES PER DAY CAPSULE ORAL
Qty: 0 | Refills: 0 | COMMUNITY
Start: 1900-01-01

## 2022-01-14 RX ORDER — DIPHENOXYLATE HYDROCHLORIDE AND ATROPINE SULFATE 2.5; .025 MG/1; MG/1
TAKE 1-2 TABLETS BY ORAL ROUTE 4 TIMES A DAY TABLET ORAL
Refills: 6 | COMMUNITY

## 2022-01-14 RX ORDER — POTASSIUM CHLORIDE 1500 MG/1
TAKE 1 TABLET (20 MEQ) BY ORAL ROUTE ONCE DAILY WITH FOOD TABLET, FILM COATED, EXTENDED RELEASE ORAL 1
Qty: 0 | Refills: 0 | COMMUNITY
Start: 1900-01-01

## 2022-01-14 RX ORDER — CHOLESTYRAMINE 4 G/9G
TAKE 2 SCOOPS (8 GRAM) EACH SCOOP BEING DISSOLVED IN 2 TO 6 OUNCES OF WATER OR NONCARBONATED BEVERAGE BY PO BID POWDER, FOR SUSPENSION ORAL 2
Qty: 30 | Refills: 6 | COMMUNITY

## 2022-01-14 RX ORDER — KETOCONAZOLE 20 MG/G
APPLY TO THE AFFECTED AREA(S) BY TOPICAL ROUTE ONCE DAILY CREAM TOPICAL 1
Qty: 1 | Refills: 0 | COMMUNITY
Start: 1900-01-01

## 2022-01-14 RX ORDER — DIFENOXIN AND ATROPINE SULFATE .025; 1 MG/1; MG/1
1 TABLET AS NEEDED TABLET ORAL
Qty: 120 TABLET | Refills: 3 | OUTPATIENT

## 2022-01-14 RX ORDER — PROMETHAZINE HYDROCHLORIDE 25 MG/1
TAKE 1 TABLET (25 MG) BY ORAL ROUTE EVERY 4-6 HOURS AS NEEDED TABLET ORAL
Qty: 0 | Refills: 0 | COMMUNITY
Start: 1900-01-01

## 2022-01-14 RX ORDER — OXYBUTYNIN CHLORIDE 5 MG/1
TAKE 1 TABLET BY ORAL ROUTE DAILY TABLET ORAL 1
Qty: 0 | Refills: 0 | COMMUNITY
Start: 1900-01-01

## 2022-01-14 RX ORDER — CETIRIZINE HYDROCHLORIDE 10 MG/1
TAKE 1 TABLET (10 MG) BY ORAL ROUTE ONCE DAILY TABLET, FILM COATED ORAL 1
Qty: 0 | Refills: 0 | COMMUNITY
Start: 1900-01-01

## 2022-01-14 RX ORDER — ZOLPIDEM TARTRATE 10 MG/1
TAKE 1 TABLET (10 MG) BY ORAL ROUTE ONCE DAILY AT BEDTIME TABLET, FILM COATED ORAL 1
Qty: 0 | Refills: 0 | COMMUNITY
Start: 1900-01-01

## 2022-01-14 RX ORDER — HYDRALAZINE HYDROCHLORIDE 50 MG/1
TAKE 1 TABLET BY ORAL ROUTE 3 TIMES A DAY TABLET ORAL
Qty: 0 | Refills: 0 | COMMUNITY
Start: 1900-01-01

## 2022-01-14 RX ORDER — ALBUTEROL SULFATE 108 UG/1
INHALE 1 PUFF (90 MCG) BY INHALATION ROUTE EVERY 6 HOURS AS NEEDED AEROSOL, METERED RESPIRATORY (INHALATION)
Qty: 1 | Refills: 0 | COMMUNITY
Start: 1900-01-01

## 2022-01-14 RX ORDER — CHOLESTYRAMINE 4 G/9G
TAKE 2 SCOOPS (8 GRAM) EACH SCOOP BEING DISSOLVED IN 2 TO 6 OUNCES OF WATER OR NONCARBONATED BEVERAGE BY PO BID POWDER, FOR SUSPENSION ORAL 2
Qty: 30 | Refills: 6

## 2022-01-14 NOTE — HPI-TODAY'S VISIT:
1/14/2021 Ms. Riddle is evaluated via telehealth for f/u of abdominal pain and diarrhea. At her last OV, she was taking AMT 25mg, bentyl, questran, and 8 lomotil with continued diarrhea. She was given a round of flagyl and changed from lomotil to motofen. She is taking 4 motofen a day. She is having about 2 BM in the morning and 2 in the evening. Overall, this is more manageable. CS

## 2022-01-14 NOTE — HPI-OTHER HISTORIES
History Of Present Illness    2019 Ms. Daphnie Riddle is here for f/u of chronic diarrhea and abdominal pain. She has a very complicated GI history. She has been treated by Dr. Garcia most recently. She had a colon perforation several years ago with subsequent right sided colon resection. She had an ostomy that was able to be reversed. She then had a ventral hernia that was repaired with mesh. She has had lower abdominal pain since. She had a CT scan with a low ventral hernia with a loop of SB. This is non obstructive at this time. She is taking bentyl with some improvement.  She has been having terrible diarrhea multiple times a day since her colon surgery.  She had normal stool studies and labs with a positive t-transglutaminase (tTG) IgG and elevated inflammatory markers. She had a normal EGD and colonoscopy with normal random bx. She is having some improvement with lomotil and cholestryramine. Her reflux is a little worse today. CS  2019 Ms. Riddle is here for f/u of diarrhea and GERD. Her cholestyramine was increased at her last OV. Her bowels are now fairly normal. She can now leave the house without worrying about having an accident. She continues to have some mid abdominal cramping. She is not taking the bentyl. She was started on zantac at her last OV and now her reflux is well controlled. Overall she is feeling better today. CS  2019 Ms. Riddle is here for diarrhea and abdominal pain. Her bowels are fairly normal with questran QID and lomotil prn. She has had to use the lomotil more frequently since her hernia surgery on the . She is in a lot of abdominal pain. This is around her ribs and incisions. She follows up with her surgeon 1/3. She is taking the bentyl 10mg BID. She has not noticed a change but not sure since she had surgery. Her reflux is not controlled on pepcid 20mg BID- worse at night. CS  3/9/2020 Ms. Riddle is here for f/u of abdominal pain and diarrhea. She was last seen in December after her hernia surgery. She saw Dr. Ritter soon after. She had a CT scan that showed inflammation of her colon along an area of a diverticuli. He ordered stool studies and was going to treat for diverticulitis if negative. She could not do the stool studies due to transportation. She was advised to f/u with us. She continues to have mid abdominal pain worse on the right midline. She continues to have diarrhea worse after eating despite choelstryramine and bentyl. She admits to very high stress. Her mother  last week and prior to that her aunt. 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 CS     Daphnie returns for follow up. She is doing ok  but still having pain. She has not had stool studies sent in as she has had three deaths in the family and dealing with children during the COVID issues. She is tolerating symptoms relatively well. Seems to be managing relatively well.  She has a lot of stress with her family in the house during this shelter at home time.  Her symptoms are tolerable but she is needing medications refilled.  She needs refills for lomoil, questran, AMT 10mg, bentyl.  I spent 7 minutes on telephone call as she could not get Televideo appointment set up properly   2020 Ms. Riddle is here for f/u of abdominal pain and diarrhea. She was last seen in April. She was doing ok on her medicaiton regimen of lomotil, questran, AMT, bentyl. She never completed the stool studies. She has had recurrent LUQ pain. She thinks she may have a hernia. Last CT scan showed possible diverticulitis. She has been under a lot of stress and her fiance passed away from a fall at work. She denies any changes in her weight. She denies any bleeding. CS   2020 Ms. Riddle is here for f/u of abdominal pain and diarrhea. At her last OV, she was having a lot of diarrhea and abdominal pain. She was treated for possible diverticulitis with cipro and flagyl. She had an increase in her stress level after the death of her fiance. Her AMT was increased to 25mg. Today, her abdominal pain is about the same. It was getting better while on the antibx and then returned as soon as they were completed. She denies any fevers. Her diarrhea is about the same. She is out of lomotil. When she takes 2 pills at a time this does help. She is also identifying triggers. CS   3/23/2021 Ms. Riddle is here for f/u of abdominal pain and diarrhea. Last year she had diverticulitis and treat with cipro and flagyl. Her pain has returned along with diarrhea. She remains on AMT 25mg, bentyl, questran, and lomotil. She denies any blood in her stool or melena. CS   2021 Ms. Riddle is here for f/u of abdominal pain and diarrhea. Last year she had diverticulitis and treat with cipro and flagyl. Her pain has returned along with diarrhea. She remains on AMT 25mg, bentyl, questran, and lomotil. She had a CT scan to r/o recurrent diveriticulitis. This was negative but it did show a verntal hernia with SB. Today, she reports the flagyl did not help. She continues to have pain and diarrhea. She is needed about 8 lomotil a day and is running out. She does not think BID questran is helping any longer. CS   2021 Ms. Riddle is here for f/u of abdominal pain and diarrhea. She has had recurrent abdominal pain. Her CT showed a ventral hernia with SB. She was referred to Dr Seaman and had surgery 2021. She has some drainage and slow incisional healing. She continues to have pain but this is slowly getting better. She is watching her diet.  She remains on AMT 25mg, bentyl, questran, and lomotil. Her diarrhea is fairly well controlled as long as she sticks to her medication schedule and watches for trigger foods. CS   2021 Ms. Riddle is evaluated via telehealth for f/u of abdominal pain and diarrhea. She had hernia surgey 2021. Her pain is better.  She remains on AMT 25mg, bentyl, questran, and lomotil. At her last OV, her diarrhea was fairly well controlled with meds and diet. Over the last month, her diarrhea has been worse. She is having to take about 8 lomotil a day with continued symptoms. She feels her stress and grieving during the holidays maybe contributing.

## 2022-04-14 ENCOUNTER — OFFICE VISIT (OUTPATIENT)
Dept: URBAN - NONMETROPOLITAN AREA CLINIC 13 | Facility: CLINIC | Age: 52
End: 2022-04-14
Payer: COMMERCIAL

## 2022-04-14 VITALS
HEIGHT: 70 IN | BODY MASS INDEX: 36.65 KG/M2 | SYSTOLIC BLOOD PRESSURE: 136 MMHG | WEIGHT: 256 LBS | HEART RATE: 106 BPM | TEMPERATURE: 98.1 F | DIASTOLIC BLOOD PRESSURE: 91 MMHG

## 2022-04-14 DIAGNOSIS — K21.9 GERD (GASTROESOPHAGEAL REFLUX DISEASE): ICD-10-CM

## 2022-04-14 DIAGNOSIS — R19.7 DIARRHEA: ICD-10-CM

## 2022-04-14 DIAGNOSIS — R10.9 ABDOMINAL PAIN: ICD-10-CM

## 2022-04-14 PROCEDURE — 99213 OFFICE O/P EST LOW 20 MIN: CPT | Performed by: NURSE PRACTITIONER

## 2022-04-14 RX ORDER — HALOBETASOL PROPIONATE 0.5 MG/G
APPLY A THIN LAYER TO THE AFFECTED AREA(S) BY TOPICAL ROUTE ONCE DAILY CREAM TOPICAL 1
Qty: 1 | Refills: 0 | COMMUNITY
Start: 1900-01-01

## 2022-04-14 RX ORDER — DICYCLOMINE HYDROCHLORIDE 10 MG/1
TAKE 1 CAPSULE (10 MG) BY ORAL ROUTE 3 TIMES PER DAY FOR 30 DAYS CAPSULE ORAL
Qty: 90 | Refills: 6 | Status: ACTIVE | COMMUNITY

## 2022-04-14 RX ORDER — DIFENOXIN AND ATROPINE SULFATE .025; 1 MG/1; MG/1
1 TABLET AS NEEDED TABLET ORAL
Qty: 120 TABLET | Refills: 3 | Status: ACTIVE | COMMUNITY

## 2022-04-14 RX ORDER — DIFENOXIN AND ATROPINE SULFATE .025; 1 MG/1; MG/1
1 TABLET AS NEEDED TABLET ORAL
Qty: 120 TABLET | Refills: 3 | OUTPATIENT

## 2022-04-14 RX ORDER — GABAPENTIN 300 MG/1
TAKE 1 CAPSULE (300 MG) BY ORAL ROUTE 3 TIMES PER DAY CAPSULE ORAL
Qty: 0 | Refills: 0 | COMMUNITY
Start: 1900-01-01

## 2022-04-14 RX ORDER — OXYBUTYNIN CHLORIDE 5 MG/1
TAKE 1 TABLET BY ORAL ROUTE DAILY TABLET ORAL 1
Qty: 0 | Refills: 0 | COMMUNITY
Start: 1900-01-01

## 2022-04-14 RX ORDER — PROPRANOLOL HYDROCHLORIDE 80 MG/1
TAKE 1 TABLET BY ORAL ROUTE DAILY TABLET ORAL 1
Qty: 0 | Refills: 0 | COMMUNITY
Start: 1900-01-01

## 2022-04-14 RX ORDER — ALBUTEROL SULFATE 108 UG/1
INHALE 1 PUFF (90 MCG) BY INHALATION ROUTE EVERY 6 HOURS AS NEEDED AEROSOL, METERED RESPIRATORY (INHALATION)
Qty: 1 | Refills: 0 | COMMUNITY
Start: 1900-01-01

## 2022-04-14 RX ORDER — OMEPRAZOLE 20 MG/1
TAKE 1 CAPSULE (20 MG) BY ORAL ROUTE ONCE DAILY BEFORE A MEAL CAPSULE, DELAYED RELEASE ORAL 1
Qty: 0 | Refills: 0 | COMMUNITY
Start: 1900-01-01

## 2022-04-14 RX ORDER — FUROSEMIDE 40 MG/1
TAKE 1 TABLET (40 MG) BY ORAL ROUTE 2 TIMES PER DAY TABLET ORAL 2
Qty: 0 | Refills: 0 | COMMUNITY
Start: 1900-01-01

## 2022-04-14 RX ORDER — HYDROCODONE BITARTRATE AND ACETAMINOPHEN 10; 325 MG/1; MG/1
TAKE 1 TABLET BY ORAL ROUTE EVERY 6 HOURS AS NEEDED FOR PAIN TABLET ORAL
Qty: 0 | Refills: 0 | COMMUNITY
Start: 1900-01-01

## 2022-04-14 RX ORDER — AMITRIPTYLINE HYDROCHLORIDE 25 MG/1
1 TABLET AT BEDTIME TABLET, FILM COATED ORAL ONCE A DAY
Qty: 30 TABLET | Refills: 11 | Status: ACTIVE | COMMUNITY

## 2022-04-14 RX ORDER — LORAZEPAM 1 MG/1
TAKE 1 TABLET BY ORAL ROUTE 2 TIMES A DAY AS NEEDED TABLET ORAL 2
Qty: 0 | Refills: 0 | COMMUNITY
Start: 1900-01-01

## 2022-04-14 RX ORDER — HYDROXYZINE HYDROCHLORIDE 25 MG/1
TAKE 1 TABLET BY ORAL ROUTE 4 TIMES A DAY TABLET, FILM COATED ORAL
Qty: 0 | Refills: 0 | COMMUNITY
Start: 1900-01-01

## 2022-04-14 RX ORDER — PROMETHAZINE HYDROCHLORIDE 25 MG/1
TAKE 1 TABLET (25 MG) BY ORAL ROUTE EVERY 4-6 HOURS AS NEEDED TABLET ORAL
Qty: 0 | Refills: 0 | COMMUNITY
Start: 1900-01-01

## 2022-04-14 RX ORDER — KETOCONAZOLE 20 MG/G
APPLY TO THE AFFECTED AREA(S) BY TOPICAL ROUTE ONCE DAILY CREAM TOPICAL 1
Qty: 1 | Refills: 0 | COMMUNITY
Start: 1900-01-01

## 2022-04-14 RX ORDER — CHOLESTYRAMINE 4 G/9G
TAKE 2 SCOOPS (8 GRAM) EACH SCOOP BEING DISSOLVED IN 2 TO 6 OUNCES OF WATER OR NONCARBONATED BEVERAGE BY PO BID POWDER, FOR SUSPENSION ORAL 2
Qty: 30 | Refills: 6 | Status: ACTIVE | COMMUNITY

## 2022-04-14 RX ORDER — CETIRIZINE HYDROCHLORIDE 10 MG/1
TAKE 1 TABLET (10 MG) BY ORAL ROUTE ONCE DAILY TABLET, FILM COATED ORAL 1
Qty: 0 | Refills: 0 | COMMUNITY
Start: 1900-01-01

## 2022-04-14 RX ORDER — DIPHENOXYLATE HYDROCHLORIDE AND ATROPINE SULFATE 2.5; .025 MG/1; MG/1
TAKE 1-2 TABLETS BY ORAL ROUTE 4 TIMES A DAY TABLET ORAL
Refills: 6 | COMMUNITY

## 2022-04-14 RX ORDER — DICYCLOMINE HYDROCHLORIDE 10 MG/1
TAKE 1 CAPSULE (10 MG) BY ORAL ROUTE 3 TIMES PER DAY FOR 30 DAYS CAPSULE ORAL
Qty: 90 | Refills: 6

## 2022-04-14 RX ORDER — ZOLPIDEM TARTRATE 10 MG/1
TAKE 1 TABLET (10 MG) BY ORAL ROUTE ONCE DAILY AT BEDTIME TABLET, FILM COATED ORAL 1
Qty: 0 | Refills: 0 | COMMUNITY
Start: 1900-01-01

## 2022-04-14 RX ORDER — AMITRIPTYLINE HYDROCHLORIDE 25 MG/1
1 TABLET AT BEDTIME TABLET, FILM COATED ORAL ONCE A DAY
Qty: 30 TABLET | Refills: 11

## 2022-04-14 RX ORDER — HYDRALAZINE HYDROCHLORIDE 50 MG/1
TAKE 1 TABLET BY ORAL ROUTE 3 TIMES A DAY TABLET ORAL
Qty: 0 | Refills: 0 | COMMUNITY
Start: 1900-01-01

## 2022-04-14 RX ORDER — PROMETHAZINE HYDROCHLORIDE 6.25 MG/5ML
TAKE 5 MILLILITERS BY ORAL ROUTE 3 TIMES A DAY AS NEEDED SOLUTION ORAL
Qty: 0 | Refills: 0 | COMMUNITY
Start: 1900-01-01

## 2022-04-14 RX ORDER — POTASSIUM CHLORIDE 1500 MG/1
TAKE 1 TABLET (20 MEQ) BY ORAL ROUTE ONCE DAILY WITH FOOD TABLET, FILM COATED, EXTENDED RELEASE ORAL 1
Qty: 0 | Refills: 0 | COMMUNITY
Start: 1900-01-01

## 2022-04-14 RX ORDER — TIZANIDINE HYDROCHLORIDE 4 MG/1
TAKE 1 CAPSULE (4 MG) BY ORAL ROUTE 3 TIMES PER DAY CAPSULE ORAL
Qty: 0 | Refills: 0 | COMMUNITY
Start: 1900-01-01

## 2022-04-14 RX ORDER — CHOLESTYRAMINE 4 G/9G
TAKE 2 SCOOPS (8 GRAM) EACH SCOOP BEING DISSOLVED IN 2 TO 6 OUNCES OF WATER OR NONCARBONATED BEVERAGE BY PO BID POWDER, FOR SUSPENSION ORAL 2
Qty: 30 | Refills: 6

## 2022-04-14 NOTE — HPI-OTHER HISTORIES
History Of Present Illness    2019 Ms. Daphnie Riddle is here for f/u of chronic diarrhea and abdominal pain. She has a very complicated GI history. She has been treated by Dr. Garcia most recently. She had a colon perforation several years ago with subsequent right sided colon resection. She had an ostomy that was able to be reversed. She then had a ventral hernia that was repaired with mesh. She has had lower abdominal pain since. She had a CT scan with a low ventral hernia with a loop of SB. This is non obstructive at this time. She is taking bentyl with some improvement.  She has been having terrible diarrhea multiple times a day since her colon surgery.  She had normal stool studies and labs with a positive t-transglutaminase (tTG) IgG and elevated inflammatory markers. She had a normal EGD and colonoscopy with normal random bx. She is having some improvement with lomotil and cholestryramine. Her reflux is a little worse today. CS  2019 Ms. Riddle is here for f/u of diarrhea and GERD. Her cholestyramine was increased at her last OV. Her bowels are now fairly normal. She can now leave the house without worrying about having an accident. She continues to have some mid abdominal cramping. She is not taking the bentyl. She was started on zantac at her last OV and now her reflux is well controlled. Overall she is feeling better today. CS  2019 Ms. Riddle is here for diarrhea and abdominal pain. Her bowels are fairly normal with questran QID and lomotil prn. She has had to use the lomotil more frequently since her hernia surgery on the . She is in a lot of abdominal pain. This is around her ribs and incisions. She follows up with her surgeon 1/3. She is taking the bentyl 10mg BID. She has not noticed a change but not sure since she had surgery. Her reflux is not controlled on pepcid 20mg BID- worse at night. CS  3/9/2020 Ms. Riddle is here for f/u of abdominal pain and diarrhea. She was last seen in December after her hernia surgery. She saw Dr. Ritter soon after. She had a CT scan that showed inflammation of her colon along an area of a diverticuli. He ordered stool studies and was going to treat for diverticulitis if negative. She could not do the stool studies due to transportation. She was advised to f/u with us. She continues to have mid abdominal pain worse on the right midline. She continues to have diarrhea worse after eating despite choelstryramine and bentyl. She admits to very high stress. Her mother  last week and prior to that her aunt. 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 CS     Daphnie returns for follow up. She is doing ok  but still having pain. She has not had stool studies sent in as she has had three deaths in the family and dealing with children during the COVID issues. She is tolerating symptoms relatively well. Seems to be managing relatively well.  She has a lot of stress with her family in the house during this shelter at home time.  Her symptoms are tolerable but she is needing medications refilled.  She needs refills for lomoil, questran, AMT 10mg, bentyl.  I spent 7 minutes on telephone call as she could not get Televideo appointment set up properly   2020 Ms. Riddle is here for f/u of abdominal pain and diarrhea. She was last seen in April. She was doing ok on her medicaiton regimen of lomotil, questran, AMT, bentyl. She never completed the stool studies. She has had recurrent LUQ pain. She thinks she may have a hernia. Last CT scan showed possible diverticulitis. She has been under a lot of stress and her fiance passed away from a fall at work. She denies any changes in her weight. She denies any bleeding. CS   2020 Ms. Riddle is here for f/u of abdominal pain and diarrhea. At her last OV, she was having a lot of diarrhea and abdominal pain. She was treated for possible diverticulitis with cipro and flagyl. She had an increase in her stress level after the death of her fiance. Her AMT was increased to 25mg. Today, her abdominal pain is about the same. It was getting better while on the antibx and then returned as soon as they were completed. She denies any fevers. Her diarrhea is about the same. She is out of lomotil. When she takes 2 pills at a time this does help. She is also identifying triggers. CS   3/23/2021 Ms. Riddle is here for f/u of abdominal pain and diarrhea. Last year she had diverticulitis and treat with cipro and flagyl. Her pain has returned along with diarrhea. She remains on AMT 25mg, bentyl, questran, and lomotil. She denies any blood in her stool or melena. CS   2021 Ms. Riddle is here for f/u of abdominal pain and diarrhea. Last year she had diverticulitis and treat with cipro and flagyl. Her pain has returned along with diarrhea. She remains on AMT 25mg, bentyl, questran, and lomotil. She had a CT scan to r/o recurrent diveriticulitis. This was negative but it did show a verntal hernia with SB. Today, she reports the flagyl did not help. She continues to have pain and diarrhea. She is needed about 8 lomotil a day and is running out. She does not think BID questran is helping any longer. CS   2021 Ms. Riddle is here for f/u of abdominal pain and diarrhea. She has had recurrent abdominal pain. Her CT showed a ventral hernia with SB. She was referred to Dr Seaman and had surgery 2021. She has some drainage and slow incisional healing. She continues to have pain but this is slowly getting better. She is watching her diet.  She remains on AMT 25mg, bentyl, questran, and lomotil. Her diarrhea is fairly well controlled as long as she sticks to her medication schedule and watches for trigger foods. CS   2021 Ms. Riddle is evaluated via telehealth for f/u of abdominal pain and diarrhea. She had hernia surgey 2021. Her pain is better.  She remains on AMT 25mg, bentyl, questran, and lomotil. At her last OV, her diarrhea was fairly well controlled with meds and diet. Over the last month, her diarrhea has been worse. She is having to take about 8 lomotil a day with continued symptoms. She feels her stress and grieving during the holidays maybe contributing. CS   2021 Ms. Riddle is evaluated via telehealth for f/u of abdominal pain and diarrhea. At her last OV, she was taking AMT 25mg, bentyl, questran, and 8 lomotil with continued diarrhea. She was given a round of flagyl and changed from lomotil to motofen. She is taking 4 motofen a day. She is having about 2 BM in the morning and 2 in the evening. Overall, this is more manageable. CS

## 2022-04-14 NOTE — HPI-TODAY'S VISIT:
4/14/2022 Ms. Riddle is  here for f/u of abdominal pain and diarrhea. She is taking AMT 25mg, bentyl, questran, and 8 motofen. She denies any further diarrhea. She is able to eat and go shopping. She is no longer scared to leave the house. She is feeling well today. She has a grandbaby, boy due any day now. CS

## 2022-05-04 ENCOUNTER — TELEPHONE ENCOUNTER (OUTPATIENT)
Dept: URBAN - NONMETROPOLITAN AREA CLINIC 2 | Facility: CLINIC | Age: 52
End: 2022-05-04

## 2022-05-04 RX ORDER — DIFENOXIN AND ATROPINE SULFATE .025; 1 MG/1; MG/1
1 TABLET AS NEEDED TABLET ORAL
Qty: 180 TABLET | Refills: 11

## 2022-07-22 ENCOUNTER — CLAIMS CREATED FROM THE CLAIM WINDOW (OUTPATIENT)
Dept: URBAN - METROPOLITAN AREA TELEHEALTH 2 | Facility: TELEHEALTH | Age: 52
End: 2022-07-22
Payer: COMMERCIAL

## 2022-07-22 ENCOUNTER — TELEPHONE ENCOUNTER (OUTPATIENT)
Dept: URBAN - METROPOLITAN AREA CLINIC 92 | Facility: CLINIC | Age: 52
End: 2022-07-22

## 2022-07-22 ENCOUNTER — LAB OUTSIDE AN ENCOUNTER (OUTPATIENT)
Dept: URBAN - METROPOLITAN AREA TELEHEALTH 2 | Facility: TELEHEALTH | Age: 52
End: 2022-07-22

## 2022-07-22 VITALS — HEIGHT: 70 IN

## 2022-07-22 DIAGNOSIS — R10.84 ABDOMINAL PAIN, GENERALIZED: ICD-10-CM

## 2022-07-22 DIAGNOSIS — R19.7 DIARRHEA: ICD-10-CM

## 2022-07-22 DIAGNOSIS — K21.9 GERD (GASTROESOPHAGEAL REFLUX DISEASE): ICD-10-CM

## 2022-07-22 DIAGNOSIS — R10.9 ABDOMINAL PAIN: ICD-10-CM

## 2022-07-22 PROCEDURE — 99214 OFFICE O/P EST MOD 30 MIN: CPT | Performed by: NURSE PRACTITIONER

## 2022-07-22 RX ORDER — DICYCLOMINE HYDROCHLORIDE 10 MG/1
TAKE 1 CAPSULE (10 MG) BY ORAL ROUTE 3 TIMES PER DAY FOR 30 DAYS CAPSULE ORAL
Qty: 90 | Refills: 6

## 2022-07-22 RX ORDER — DIFENOXIN AND ATROPINE SULFATE .025; 1 MG/1; MG/1
1 TABLET AS NEEDED TABLET ORAL
Qty: 120 TABLET | Refills: 3 | OUTPATIENT

## 2022-07-22 RX ORDER — HYDRALAZINE HYDROCHLORIDE 50 MG/1
TAKE 1 TABLET BY ORAL ROUTE 3 TIMES A DAY TABLET ORAL
Qty: 0 | Refills: 0 | COMMUNITY
Start: 1900-01-01

## 2022-07-22 RX ORDER — DIFENOXIN AND ATROPINE SULFATE .025; 1 MG/1; MG/1
1 TABLET AS NEEDED TABLET ORAL
Qty: 180 TABLET | Refills: 11 | Status: ACTIVE | COMMUNITY

## 2022-07-22 RX ORDER — CETIRIZINE HYDROCHLORIDE 10 MG/1
TAKE 1 TABLET (10 MG) BY ORAL ROUTE ONCE DAILY TABLET, FILM COATED ORAL 1
Qty: 0 | Refills: 0 | COMMUNITY
Start: 1900-01-01

## 2022-07-22 RX ORDER — AMITRIPTYLINE HYDROCHLORIDE 25 MG/1
1 TABLET AT BEDTIME TABLET, FILM COATED ORAL ONCE A DAY
Qty: 30 TABLET | Refills: 11

## 2022-07-22 RX ORDER — HALOBETASOL PROPIONATE 0.5 MG/G
APPLY A THIN LAYER TO THE AFFECTED AREA(S) BY TOPICAL ROUTE ONCE DAILY CREAM TOPICAL 1
Qty: 1 | Refills: 0 | COMMUNITY
Start: 1900-01-01

## 2022-07-22 RX ORDER — HYDROCODONE BITARTRATE AND ACETAMINOPHEN 10; 325 MG/1; MG/1
TAKE 1 TABLET BY ORAL ROUTE EVERY 6 HOURS AS NEEDED FOR PAIN TABLET ORAL
Qty: 0 | Refills: 0 | COMMUNITY
Start: 1900-01-01

## 2022-07-22 RX ORDER — PROMETHAZINE HYDROCHLORIDE 6.25 MG/5ML
TAKE 5 MILLILITERS BY ORAL ROUTE 3 TIMES A DAY AS NEEDED SOLUTION ORAL
Qty: 0 | Refills: 0 | COMMUNITY
Start: 1900-01-01

## 2022-07-22 RX ORDER — AMITRIPTYLINE HYDROCHLORIDE 25 MG/1
1 TABLET AT BEDTIME TABLET, FILM COATED ORAL ONCE A DAY
Qty: 30 TABLET | Refills: 11 | Status: ACTIVE | COMMUNITY

## 2022-07-22 RX ORDER — TIZANIDINE HYDROCHLORIDE 4 MG/1
TAKE 1 CAPSULE (4 MG) BY ORAL ROUTE 3 TIMES PER DAY CAPSULE ORAL
Qty: 0 | Refills: 0 | COMMUNITY
Start: 1900-01-01

## 2022-07-22 RX ORDER — ZOLPIDEM TARTRATE 10 MG/1
TAKE 1 TABLET (10 MG) BY ORAL ROUTE ONCE DAILY AT BEDTIME TABLET, FILM COATED ORAL 1
Qty: 0 | Refills: 0 | COMMUNITY
Start: 1900-01-01

## 2022-07-22 RX ORDER — DIPHENOXYLATE HYDROCHLORIDE AND ATROPINE SULFATE 2.5; .025 MG/1; MG/1
TAKE 1-2 TABLETS BY ORAL ROUTE 4 TIMES A DAY TABLET ORAL
Refills: 6 | COMMUNITY

## 2022-07-22 RX ORDER — CHOLESTYRAMINE 4 G/9G
TAKE 2 SCOOPS (8 GRAM) EACH SCOOP BEING DISSOLVED IN 2 TO 6 OUNCES OF WATER OR NONCARBONATED BEVERAGE BY PO BID POWDER, FOR SUSPENSION ORAL 2
Qty: 30 | Refills: 6

## 2022-07-22 RX ORDER — OXYBUTYNIN CHLORIDE 5 MG/1
TAKE 1 TABLET BY ORAL ROUTE DAILY TABLET ORAL 1
Qty: 0 | Refills: 0 | COMMUNITY
Start: 1900-01-01

## 2022-07-22 RX ORDER — GABAPENTIN 300 MG/1
TAKE 1 CAPSULE (300 MG) BY ORAL ROUTE 3 TIMES PER DAY CAPSULE ORAL
Qty: 0 | Refills: 0 | COMMUNITY
Start: 1900-01-01

## 2022-07-22 RX ORDER — FUROSEMIDE 40 MG/1
TAKE 1 TABLET (40 MG) BY ORAL ROUTE 2 TIMES PER DAY TABLET ORAL 2
Qty: 0 | Refills: 0 | COMMUNITY
Start: 1900-01-01

## 2022-07-22 RX ORDER — HYDROXYZINE HYDROCHLORIDE 25 MG/1
TAKE 1 TABLET BY ORAL ROUTE 4 TIMES A DAY TABLET, FILM COATED ORAL
Qty: 0 | Refills: 0 | COMMUNITY
Start: 1900-01-01

## 2022-07-22 RX ORDER — METRONIDAZOLE 500 MG/1
1 TABLET TABLET, FILM COATED ORAL THREE TIMES A DAY
Qty: 42 TABLET | Refills: 3
Start: 2021-12-17 | End: 2022-09-16

## 2022-07-22 RX ORDER — OMEPRAZOLE 20 MG/1
TAKE 1 CAPSULE (20 MG) BY ORAL ROUTE ONCE DAILY BEFORE A MEAL CAPSULE, DELAYED RELEASE ORAL 1
Qty: 0 | Refills: 0 | COMMUNITY
Start: 1900-01-01

## 2022-07-22 RX ORDER — PROMETHAZINE HYDROCHLORIDE 25 MG/1
TAKE 1 TABLET (25 MG) BY ORAL ROUTE EVERY 4-6 HOURS AS NEEDED TABLET ORAL
Qty: 0 | Refills: 0 | COMMUNITY
Start: 1900-01-01

## 2022-07-22 RX ORDER — POTASSIUM CHLORIDE 1500 MG/1
TAKE 1 TABLET (20 MEQ) BY ORAL ROUTE ONCE DAILY WITH FOOD TABLET, FILM COATED, EXTENDED RELEASE ORAL 1
Qty: 0 | Refills: 0 | COMMUNITY
Start: 1900-01-01

## 2022-07-22 RX ORDER — KETOCONAZOLE 20 MG/G
APPLY TO THE AFFECTED AREA(S) BY TOPICAL ROUTE ONCE DAILY CREAM TOPICAL 1
Qty: 1 | Refills: 0 | COMMUNITY
Start: 1900-01-01

## 2022-07-22 RX ORDER — DICYCLOMINE HYDROCHLORIDE 10 MG/1
TAKE 1 CAPSULE (10 MG) BY ORAL ROUTE 3 TIMES PER DAY FOR 30 DAYS CAPSULE ORAL
Qty: 90 | Refills: 6 | Status: ACTIVE | COMMUNITY

## 2022-07-22 RX ORDER — ALBUTEROL SULFATE 108 UG/1
INHALE 1 PUFF (90 MCG) BY INHALATION ROUTE EVERY 6 HOURS AS NEEDED AEROSOL, METERED RESPIRATORY (INHALATION)
Qty: 1 | Refills: 0 | COMMUNITY
Start: 1900-01-01

## 2022-07-22 RX ORDER — CHOLESTYRAMINE 4 G/9G
TAKE 2 SCOOPS (8 GRAM) EACH SCOOP BEING DISSOLVED IN 2 TO 6 OUNCES OF WATER OR NONCARBONATED BEVERAGE BY PO BID POWDER, FOR SUSPENSION ORAL 2
Qty: 30 | Refills: 6 | Status: ACTIVE | COMMUNITY

## 2022-07-22 RX ORDER — PROPRANOLOL HYDROCHLORIDE 80 MG/1
TAKE 1 TABLET BY ORAL ROUTE DAILY TABLET ORAL 1
Qty: 0 | Refills: 0 | COMMUNITY
Start: 1900-01-01

## 2022-07-22 RX ORDER — LORAZEPAM 1 MG/1
TAKE 1 TABLET BY ORAL ROUTE 2 TIMES A DAY AS NEEDED TABLET ORAL 2
Qty: 0 | Refills: 0 | COMMUNITY
Start: 1900-01-01

## 2022-07-22 NOTE — HPI-TODAY'S VISIT:
7/22/2022 Ms. Riddle is  here for f/u of abdominal pain and diarrhea. She is taking AMT 25mg, bentyl, questran, and 8 motofen. Her pharmacy has been having trouble getting her questran. This has caused her diarrhea to return. She has had to use up to 12 motofen a day. She is under more stress and this is not helping. She is starting to have pain again and wonders if her hernia is back. She was told by Dr Seaman it would return if she did not stop smoking. CS

## 2022-07-22 NOTE — HPI-OTHER HISTORIES
History Of Present Illness    2019 Ms. Daphnie Riddle is here for f/u of chronic diarrhea and abdominal pain. She has a very complicated GI history. She has been treated by Dr. Garcia most recently. She had a colon perforation several years ago with subsequent right sided colon resection. She had an ostomy that was able to be reversed. She then had a ventral hernia that was repaired with mesh. She has had lower abdominal pain since. She had a CT scan with a low ventral hernia with a loop of SB. This is non obstructive at this time. She is taking bentyl with some improvement.  She has been having terrible diarrhea multiple times a day since her colon surgery.  She had normal stool studies and labs with a positive t-transglutaminase (tTG) IgG and elevated inflammatory markers. She had a normal EGD and colonoscopy with normal random bx. She is having some improvement with lomotil and cholestryramine. Her reflux is a little worse today. CS  2019 Ms. Riddle is here for f/u of diarrhea and GERD. Her cholestyramine was increased at her last OV. Her bowels are now fairly normal. She can now leave the house without worrying about having an accident. She continues to have some mid abdominal cramping. She is not taking the bentyl. She was started on zantac at her last OV and now her reflux is well controlled. Overall she is feeling better today. CS  2019 Ms. Riddle is here for diarrhea and abdominal pain. Her bowels are fairly normal with questran QID and lomotil prn. She has had to use the lomotil more frequently since her hernia surgery on the . She is in a lot of abdominal pain. This is around her ribs and incisions. She follows up with her surgeon 1/3. She is taking the bentyl 10mg BID. She has not noticed a change but not sure since she had surgery. Her reflux is not controlled on pepcid 20mg BID- worse at night. CS  3/9/2020 Ms. Riddle is here for f/u of abdominal pain and diarrhea. She was last seen in December after her hernia surgery. She saw Dr. Ritter soon after. She had a CT scan that showed inflammation of her colon along an area of a diverticuli. He ordered stool studies and was going to treat for diverticulitis if negative. She could not do the stool studies due to transportation. She was advised to f/u with us. She continues to have mid abdominal pain worse on the right midline. She continues to have diarrhea worse after eating despite choelstryramine and bentyl. She admits to very high stress. Her mother  last week and prior to that her aunt. 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 CS     Daphnie returns for follow up. She is doing ok  but still having pain. She has not had stool studies sent in as she has had three deaths in the family and dealing with children during the COVID issues. She is tolerating symptoms relatively well. Seems to be managing relatively well.  She has a lot of stress with her family in the house during this shelter at home time.  Her symptoms are tolerable but she is needing medications refilled.  She needs refills for lomoil, questran, AMT 10mg, bentyl.  I spent 7 minutes on telephone call as she could not get Televideo appointment set up properly   2020 Ms. Riddle is here for f/u of abdominal pain and diarrhea. She was last seen in April. She was doing ok on her medicaiton regimen of lomotil, questran, AMT, bentyl. She never completed the stool studies. She has had recurrent LUQ pain. She thinks she may have a hernia. Last CT scan showed possible diverticulitis. She has been under a lot of stress and her fiance passed away from a fall at work. She denies any changes in her weight. She denies any bleeding. CS   2020 Ms. Riddle is here for f/u of abdominal pain and diarrhea. At her last OV, she was having a lot of diarrhea and abdominal pain. She was treated for possible diverticulitis with cipro and flagyl. She had an increase in her stress level after the death of her fiance. Her AMT was increased to 25mg. Today, her abdominal pain is about the same. It was getting better while on the antibx and then returned as soon as they were completed. She denies any fevers. Her diarrhea is about the same. She is out of lomotil. When she takes 2 pills at a time this does help. She is also identifying triggers. CS   3/23/2021 Ms. Riddle is here for f/u of abdominal pain and diarrhea. Last year she had diverticulitis and treat with cipro and flagyl. Her pain has returned along with diarrhea. She remains on AMT 25mg, bentyl, questran, and lomotil. She denies any blood in her stool or melena. CS   2021 Ms. Riddle is here for f/u of abdominal pain and diarrhea. Last year she had diverticulitis and treat with cipro and flagyl. Her pain has returned along with diarrhea. She remains on AMT 25mg, bentyl, questran, and lomotil. She had a CT scan to r/o recurrent diveriticulitis. This was negative but it did show a verntal hernia with SB. Today, she reports the flagyl did not help. She continues to have pain and diarrhea. She is needed about 8 lomotil a day and is running out. She does not think BID questran is helping any longer. CS   2021 Ms. Riddle is here for f/u of abdominal pain and diarrhea. She has had recurrent abdominal pain. Her CT showed a ventral hernia with SB. She was referred to Dr Seaman and had surgery 2021. She has some drainage and slow incisional healing. She continues to have pain but this is slowly getting better. She is watching her diet.  She remains on AMT 25mg, bentyl, questran, and lomotil. Her diarrhea is fairly well controlled as long as she sticks to her medication schedule and watches for trigger foods. CS   2021 Ms. Riddle is evaluated via telehealth for f/u of abdominal pain and diarrhea. She had hernia surgey 2021. Her pain is better.  She remains on AMT 25mg, bentyl, questran, and lomotil. At her last OV, her diarrhea was fairly well controlled with meds and diet. Over the last month, her diarrhea has been worse. She is having to take about 8 lomotil a day with continued symptoms. She feels her stress and grieving during the holidays maybe contributing. CS   2021 Ms. Riddle is evaluated via telehealth for f/u of abdominal pain and diarrhea. At her last OV, she was taking AMT 25mg, bentyl, questran, and 8 lomotil with continued diarrhea. She was given a round of flagyl and changed from lomotil to motofen. She is taking 4 motofen a day. She is having about 2 BM in the morning and 2 in the evening. Overall, this is more manageable. CS  2022 Ms. Riddle is  here for f/u of abdominal pain and diarrhea. She is taking AMT 25mg, bentyl, questran, and 8 motofen. She denies any further diarrhea. She is able to eat and go shopping. She is no longer scared to leave the house. She is feeling well today. She has a grandbaby, boy due any day now. CS

## 2022-12-05 ENCOUNTER — TELEPHONE ENCOUNTER (OUTPATIENT)
Dept: URBAN - METROPOLITAN AREA CLINIC 92 | Facility: CLINIC | Age: 52
End: 2022-12-05

## 2022-12-05 RX ORDER — DIFENOXIN AND ATROPINE SULFATE .025; 1 MG/1; MG/1
1 TABLET AS NEEDED TABLET ORAL
Qty: 120 TABLET | Refills: 3
End: 2023-04-04

## 2023-02-21 ENCOUNTER — TELEPHONE ENCOUNTER (OUTPATIENT)
Dept: URBAN - NONMETROPOLITAN AREA CLINIC 13 | Facility: CLINIC | Age: 53
End: 2023-02-21

## 2023-02-21 RX ORDER — DICYCLOMINE HYDROCHLORIDE 10 MG/1
TAKE 1 CAPSULE (10 MG) BY ORAL ROUTE 3 TIMES PER DAY FOR 30 DAYS CAPSULE ORAL
Qty: 90 | Refills: 3

## 2023-02-21 RX ORDER — METRONIDAZOLE 500 MG/1
1 TABLET TABLET, FILM COATED ORAL THREE TIMES A DAY
Qty: 42 TABLET | Refills: 3
Start: 2021-12-17 | End: 2023-04-18

## 2023-02-22 ENCOUNTER — TELEPHONE ENCOUNTER (OUTPATIENT)
Dept: URBAN - NONMETROPOLITAN AREA CLINIC 2 | Facility: CLINIC | Age: 53
End: 2023-02-22

## 2023-02-22 RX ORDER — METRONIDAZOLE 500 MG/1
1 TABLET TABLET, FILM COATED ORAL THREE TIMES A DAY
Qty: 42 TABLET | Refills: 3
Start: 2021-12-17 | End: 2023-04-19

## 2023-03-01 ENCOUNTER — TELEPHONE ENCOUNTER (OUTPATIENT)
Dept: URBAN - NONMETROPOLITAN AREA CLINIC 13 | Facility: CLINIC | Age: 53
End: 2023-03-01

## 2023-04-20 ENCOUNTER — TELEPHONE ENCOUNTER (OUTPATIENT)
Dept: URBAN - NONMETROPOLITAN AREA CLINIC 2 | Facility: CLINIC | Age: 53
End: 2023-04-20

## 2023-04-20 RX ORDER — CHOLESTYRAMINE 4 G/9G
TAKE 2 SCOOPS (8 GRAM) EACH SCOOP POWDER, FOR SUSPENSION ORAL TWICE A DAY
Qty: 1 | Refills: 11

## 2023-04-21 ENCOUNTER — LAB OUTSIDE AN ENCOUNTER (OUTPATIENT)
Dept: URBAN - METROPOLITAN AREA TELEHEALTH 2 | Facility: TELEHEALTH | Age: 53
End: 2023-04-21

## 2023-04-21 ENCOUNTER — OFFICE VISIT (OUTPATIENT)
Dept: URBAN - METROPOLITAN AREA TELEHEALTH 2 | Facility: TELEHEALTH | Age: 53
End: 2023-04-21
Payer: COMMERCIAL

## 2023-04-21 VITALS — BODY MASS INDEX: 31.64 KG/M2 | HEIGHT: 70 IN | WEIGHT: 221 LBS

## 2023-04-21 DIAGNOSIS — R19.7 DIARRHEA: ICD-10-CM

## 2023-04-21 DIAGNOSIS — R10.84 ABDOMINAL CRAMPING, GENERALIZED: ICD-10-CM

## 2023-04-21 DIAGNOSIS — K21.9 GERD (GASTROESOPHAGEAL REFLUX DISEASE): ICD-10-CM

## 2023-04-21 PROCEDURE — 99214 OFFICE O/P EST MOD 30 MIN: CPT | Performed by: NURSE PRACTITIONER

## 2023-04-21 RX ORDER — ZOLPIDEM TARTRATE 10 MG/1
TAKE 1 TABLET (10 MG) BY ORAL ROUTE ONCE DAILY AT BEDTIME TABLET, FILM COATED ORAL 1
Qty: 0 | Refills: 0 | COMMUNITY
Start: 1900-01-01

## 2023-04-21 RX ORDER — ALBUTEROL SULFATE 108 UG/1
INHALE 1 PUFF (90 MCG) BY INHALATION ROUTE EVERY 6 HOURS AS NEEDED AEROSOL, METERED RESPIRATORY (INHALATION)
Qty: 1 | Refills: 0 | COMMUNITY
Start: 1900-01-01

## 2023-04-21 RX ORDER — KETOCONAZOLE 20 MG/G
APPLY TO THE AFFECTED AREA(S) BY TOPICAL ROUTE ONCE DAILY CREAM TOPICAL 1
Qty: 1 | Refills: 0 | COMMUNITY
Start: 1900-01-01

## 2023-04-21 RX ORDER — DICYCLOMINE HYDROCHLORIDE 10 MG/1
TAKE 1 CAPSULE (10 MG) BY ORAL ROUTE 3 TIMES PER DAY FOR 30 DAYS CAPSULE ORAL
Qty: 90 | Refills: 6

## 2023-04-21 RX ORDER — HYDROXYZINE HYDROCHLORIDE 25 MG/1
TAKE 1 TABLET BY ORAL ROUTE 4 TIMES A DAY TABLET, FILM COATED ORAL
Qty: 0 | Refills: 0 | COMMUNITY
Start: 1900-01-01

## 2023-04-21 RX ORDER — FUROSEMIDE 40 MG/1
TAKE 1 TABLET (40 MG) BY ORAL ROUTE 2 TIMES PER DAY TABLET ORAL 2
Qty: 0 | Refills: 0 | COMMUNITY
Start: 1900-01-01

## 2023-04-21 RX ORDER — AMITRIPTYLINE HYDROCHLORIDE 25 MG/1
1 TABLET AT BEDTIME TABLET, FILM COATED ORAL ONCE A DAY
Qty: 30 TABLET | Refills: 11 | Status: ACTIVE | COMMUNITY

## 2023-04-21 RX ORDER — GABAPENTIN 300 MG/1
TAKE 1 CAPSULE (300 MG) BY ORAL ROUTE 3 TIMES PER DAY CAPSULE ORAL
Qty: 0 | Refills: 0 | COMMUNITY
Start: 1900-01-01

## 2023-04-21 RX ORDER — HALOBETASOL PROPIONATE 0.5 MG/G
APPLY A THIN LAYER TO THE AFFECTED AREA(S) BY TOPICAL ROUTE ONCE DAILY CREAM TOPICAL 1
Qty: 1 | Refills: 0 | COMMUNITY
Start: 1900-01-01

## 2023-04-21 RX ORDER — DIPHENOXYLATE HYDROCHLORIDE AND ATROPINE SULFATE 2.5; .025 MG/1; MG/1
TAKE 1-2 TABLETS BY ORAL ROUTE 4 TIMES A DAY TABLET ORAL
Refills: 6 | COMMUNITY

## 2023-04-21 RX ORDER — PROMETHAZINE HYDROCHLORIDE 25 MG/1
TAKE 1 TABLET (25 MG) BY ORAL ROUTE EVERY 4-6 HOURS AS NEEDED TABLET ORAL
Qty: 0 | Refills: 0 | COMMUNITY
Start: 1900-01-01

## 2023-04-21 RX ORDER — CETIRIZINE HYDROCHLORIDE 10 MG/1
TAKE 1 TABLET (10 MG) BY ORAL ROUTE ONCE DAILY TABLET, FILM COATED ORAL 1
Qty: 0 | Refills: 0 | COMMUNITY
Start: 1900-01-01

## 2023-04-21 RX ORDER — HYDRALAZINE HYDROCHLORIDE 50 MG/1
TAKE 1 TABLET BY ORAL ROUTE 3 TIMES A DAY TABLET ORAL
Qty: 0 | Refills: 0 | COMMUNITY
Start: 1900-01-01

## 2023-04-21 RX ORDER — PROPRANOLOL HYDROCHLORIDE 80 MG/1
TAKE 1 TABLET BY ORAL ROUTE DAILY TABLET ORAL 1
Qty: 0 | Refills: 0 | COMMUNITY
Start: 1900-01-01

## 2023-04-21 RX ORDER — POTASSIUM CHLORIDE 1500 MG/1
TAKE 1 TABLET (20 MEQ) BY ORAL ROUTE ONCE DAILY WITH FOOD TABLET, FILM COATED, EXTENDED RELEASE ORAL 1
Qty: 0 | Refills: 0 | COMMUNITY
Start: 1900-01-01

## 2023-04-21 RX ORDER — CHOLESTYRAMINE 4 G/9G
TAKE 2 SCOOPS (8 GRAM) EACH SCOOP BEING DISSOLVED IN 2 TO 6 OUNCES OF WATER OR NONCARBONATED BEVERAGE BY PO BID POWDER, FOR SUSPENSION ORAL 2
Qty: 30 | Refills: 6

## 2023-04-21 RX ORDER — METRONIDAZOLE 500 MG/1
1 TABLET TABLET, FILM COATED ORAL THREE TIMES A DAY
Qty: 42 TABLET | Refills: 0 | OUTPATIENT
Start: 2023-04-21 | End: 2023-05-05

## 2023-04-21 RX ORDER — OXYBUTYNIN CHLORIDE 5 MG/1
TAKE 1 TABLET BY ORAL ROUTE DAILY TABLET ORAL 1
Qty: 0 | Refills: 0 | COMMUNITY
Start: 1900-01-01

## 2023-04-21 RX ORDER — AMITRIPTYLINE HYDROCHLORIDE 25 MG/1
1 TABLET AT BEDTIME TABLET, FILM COATED ORAL ONCE A DAY
Qty: 30 TABLET | Refills: 11

## 2023-04-21 RX ORDER — DICYCLOMINE HYDROCHLORIDE 10 MG/1
TAKE 1 CAPSULE (10 MG) BY ORAL ROUTE 3 TIMES PER DAY FOR 30 DAYS CAPSULE ORAL
Qty: 90 | Refills: 3 | Status: ACTIVE | COMMUNITY

## 2023-04-21 RX ORDER — CHOLESTYRAMINE 4 G/9G
TAKE 2 SCOOPS (8 GRAM) EACH SCOOP BEING DISSOLVED IN 2 TO 6 OUNCES OF WATER OR NONCARBONATED BEVERAGE BY PO BID POWDER, FOR SUSPENSION ORAL 2
Qty: 30 | Refills: 6 | Status: ACTIVE | COMMUNITY

## 2023-04-21 RX ORDER — DIFENOXIN AND ATROPINE SULFATE .025; 1 MG/1; MG/1
1 TABLET AS NEEDED TABLET ORAL
Qty: 120 TABLET | Refills: 3 | OUTPATIENT

## 2023-04-21 RX ORDER — TIZANIDINE HYDROCHLORIDE 4 MG/1
TAKE 1 CAPSULE (4 MG) BY ORAL ROUTE 3 TIMES PER DAY CAPSULE ORAL
Qty: 0 | Refills: 0 | COMMUNITY
Start: 1900-01-01

## 2023-04-21 RX ORDER — OMEPRAZOLE 20 MG/1
TAKE 1 CAPSULE (20 MG) BY ORAL ROUTE ONCE DAILY BEFORE A MEAL CAPSULE, DELAYED RELEASE ORAL 1
Qty: 0 | Refills: 0 | COMMUNITY
Start: 1900-01-01

## 2023-04-21 RX ORDER — HYDROCODONE BITARTRATE AND ACETAMINOPHEN 10; 325 MG/1; MG/1
TAKE 1 TABLET BY ORAL ROUTE EVERY 6 HOURS AS NEEDED FOR PAIN TABLET ORAL
Qty: 0 | Refills: 0 | COMMUNITY
Start: 1900-01-01

## 2023-04-21 RX ORDER — LORAZEPAM 1 MG/1
TAKE 1 TABLET BY ORAL ROUTE 2 TIMES A DAY AS NEEDED TABLET ORAL 2
Qty: 0 | Refills: 0 | COMMUNITY
Start: 1900-01-01

## 2023-04-21 RX ORDER — PROMETHAZINE HYDROCHLORIDE 6.25 MG/5ML
TAKE 5 MILLILITERS BY ORAL ROUTE 3 TIMES A DAY AS NEEDED SOLUTION ORAL
Qty: 0 | Refills: 0 | COMMUNITY
Start: 1900-01-01

## 2023-04-21 NOTE — PHYSICAL EXAM CONSTITUTIONAL:
well developed, well nourished , in no acute distress , ambulating without difficulty , normal communication ability Ear Star Wedge Flap Text: The defect edges were debeveled with a #15 blade scalpel.  Given the location of the defect and the proximity to free margins (helical rim) an ear star wedge flap was deemed most appropriate.  Using a sterile surgical marker, the appropriate flap was drawn incorporating the defect and placing the expected incisions between the helical rim and antihelix where possible.  The area thus outlined was incised through and through with a #15 scalpel blade.

## 2023-04-21 NOTE — HPI-TODAY'S VISIT:
4/21/2023 Ms. Riddle is  evaluated via telehealth for f/u of abdominal pain and diarrhea. She is taking AMT 25mg, bentyl, questran, and 8 motofen. She continues to have diarrhea and abdominal pain. Her pain is worse lately. She has an abdominal hernia and she feels it has gotten bigger. She was evaluated last year for this and Dr Seaman wanter her to stop smoking first. She is no longer smoking or only very little. She is under a lot of stress and knows this is likely contributing.. CS

## 2023-04-21 NOTE — HPI-OTHER HISTORIES
History Of Present Illness    2019 Ms. Daphnie Riddle is here for f/u of chronic diarrhea and abdominal pain. She has a very complicated GI history. She has been treated by Dr. Garcia most recently. She had a colon perforation several years ago with subsequent right sided colon resection. She had an ostomy that was able to be reversed. She then had a ventral hernia that was repaired with mesh. She has had lower abdominal pain since. She had a CT scan with a low ventral hernia with a loop of SB. This is non obstructive at this time. She is taking bentyl with some improvement.  She has been having terrible diarrhea multiple times a day since her colon surgery.  She had normal stool studies and labs with a positive t-transglutaminase (tTG) IgG and elevated inflammatory markers. She had a normal EGD and colonoscopy with normal random bx. She is having some improvement with lomotil and cholestryramine. Her reflux is a little worse today. CS  2019 Ms. Riddle is here for f/u of diarrhea and GERD. Her cholestyramine was increased at her last OV. Her bowels are now fairly normal. She can now leave the house without worrying about having an accident. She continues to have some mid abdominal cramping. She is not taking the bentyl. She was started on zantac at her last OV and now her reflux is well controlled. Overall she is feeling better today. CS  2019 Ms. Riddle is here for diarrhea and abdominal pain. Her bowels are fairly normal with questran QID and lomotil prn. She has had to use the lomotil more frequently since her hernia surgery on the . She is in a lot of abdominal pain. This is around her ribs and incisions. She follows up with her surgeon 1/3. She is taking the bentyl 10mg BID. She has not noticed a change but not sure since she had surgery. Her reflux is not controlled on pepcid 20mg BID- worse at night. CS  3/9/2020 Ms. Riddle is here for f/u of abdominal pain and diarrhea. She was last seen in December after her hernia surgery. She saw Dr. Ritter soon after. She had a CT scan that showed inflammation of her colon along an area of a diverticuli. He ordered stool studies and was going to treat for diverticulitis if negative. She could not do the stool studies due to transportation. She was advised to f/u with us. She continues to have mid abdominal pain worse on the right midline. She continues to have diarrhea worse after eating despite choelstryramine and bentyl. She admits to very high stress. Her mother  last week and prior to that her aunt. 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 CS     Daphnie returns for follow up. She is doing ok  but still having pain. She has not had stool studies sent in as she has had three deaths in the family and dealing with children during the COVID issues. She is tolerating symptoms relatively well. Seems to be managing relatively well.  She has a lot of stress with her family in the house during this shelter at home time.  Her symptoms are tolerable but she is needing medications refilled.  She needs refills for lomoil, questran, AMT 10mg, bentyl.  I spent 7 minutes on telephone call as she could not get Televideo appointment set up properly   2020 Ms. Riddle is here for f/u of abdominal pain and diarrhea. She was last seen in April. She was doing ok on her medicaiton regimen of lomotil, questran, AMT, bentyl. She never completed the stool studies. She has had recurrent LUQ pain. She thinks she may have a hernia. Last CT scan showed possible diverticulitis. She has been under a lot of stress and her fiance passed away from a fall at work. She denies any changes in her weight. She denies any bleeding. CS   2020 Ms. Riddle is here for f/u of abdominal pain and diarrhea. At her last OV, she was having a lot of diarrhea and abdominal pain. She was treated for possible diverticulitis with cipro and flagyl. She had an increase in her stress level after the death of her fiance. Her AMT was increased to 25mg. Today, her abdominal pain is about the same. It was getting better while on the antibx and then returned as soon as they were completed. She denies any fevers. Her diarrhea is about the same. She is out of lomotil. When she takes 2 pills at a time this does help. She is also identifying triggers. CS   3/23/2021 Ms. Riddle is here for f/u of abdominal pain and diarrhea. Last year she had diverticulitis and treat with cipro and flagyl. Her pain has returned along with diarrhea. She remains on AMT 25mg, bentyl, questran, and lomotil. She denies any blood in her stool or melena. CS   2021 Ms. Riddle is here for f/u of abdominal pain and diarrhea. Last year she had diverticulitis and treat with cipro and flagyl. Her pain has returned along with diarrhea. She remains on AMT 25mg, bentyl, questran, and lomotil. She had a CT scan to r/o recurrent diveriticulitis. This was negative but it did show a verntal hernia with SB. Today, she reports the flagyl did not help. She continues to have pain and diarrhea. She is needed about 8 lomotil a day and is running out. She does not think BID questran is helping any longer. CS   2021 Ms. Riddle is here for f/u of abdominal pain and diarrhea. She has had recurrent abdominal pain. Her CT showed a ventral hernia with SB. She was referred to Dr Seaman and had surgery 2021. She has some drainage and slow incisional healing. She continues to have pain but this is slowly getting better. She is watching her diet.  She remains on AMT 25mg, bentyl, questran, and lomotil. Her diarrhea is fairly well controlled as long as she sticks to her medication schedule and watches for trigger foods. CS   2021 Ms. Riddle is evaluated via telehealth for f/u of abdominal pain and diarrhea. She had hernia surgey 2021. Her pain is better.  She remains on AMT 25mg, bentyl, questran, and lomotil. At her last OV, her diarrhea was fairly well controlled with meds and diet. Over the last month, her diarrhea has been worse. She is having to take about 8 lomotil a day with continued symptoms. She feels her stress and grieving during the holidays maybe contributing. CS   2021 Ms. Riddle is evaluated via telehealth for f/u of abdominal pain and diarrhea. At her last OV, she was taking AMT 25mg, bentyl, questran, and 8 lomotil with continued diarrhea. She was given a round of flagyl and changed from lomotil to motofen. She is taking 4 motofen a day. She is having about 2 BM in the morning and 2 in the evening. Overall, this is more manageable. CS  2022 Ms. Riddle is  here for f/u of abdominal pain and diarrhea. She is taking AMT 25mg, bentyl, questran, and 8 motofen. She denies any further diarrhea. She is able to eat and go shopping. She is no longer scared to leave the house. She is feeling well today. She has a grandbaby, boy due any day now. CS  2022 Ms. Riddle is  here for f/u of abdominal pain and diarrhea. She is taking AMT 25mg, bentyl, questran, and 8 motofen. Her pharmacy has been having trouble getting her questran. This has caused her diarrhea to return. She has had to use up to 12 motofen a day. She is under more stress and this is not helping. She is starting to have pain again and wonders if her hernia is back. She was told by Dr Seaman it would return if she did not stop smoking. CS

## 2023-04-21 NOTE — PHYSICAL EXAM CHEST:
breathing is unlabored without accessory muscle use, normal breath sounds [facility] FROM [place] [facility] CALLED TO HAVE A MED REQUEST FOR   zolpidem (AMBIEN) 10 MG tablet  TO GO TO     [facility] - [place], [address] - [phone] [facility] [phone]   [phone]    [facility]'S CALL BACK NUMBER [phone]

## 2023-05-10 ENCOUNTER — TELEPHONE ENCOUNTER (OUTPATIENT)
Dept: URBAN - NONMETROPOLITAN AREA CLINIC 2 | Facility: CLINIC | Age: 53
End: 2023-05-10

## 2023-05-10 RX ORDER — DICYCLOMINE HYDROCHLORIDE 10 MG/1
TAKE 1 CAPSULE (10 MG) BY ORAL ROUTE 3 TIMES PER DAY FOR 30 DAYS CAPSULE ORAL
Qty: 90 | Refills: 6

## 2023-05-10 RX ORDER — CHOLESTYRAMINE 4 G/9G
TAKE 2 SCOOPS (8 GRAM) POWDER, FOR SUSPENSION ORAL TWICE A DAY
Qty: 120 | Refills: 11

## 2023-05-10 RX ORDER — DIFENOXIN AND ATROPINE SULFATE .025; 1 MG/1; MG/1
1 TABLET AS NEEDED TABLET ORAL
Qty: 120 TABLET | Refills: 5

## 2023-05-10 RX ORDER — AMITRIPTYLINE HYDROCHLORIDE 25 MG/1
1 TABLET AT BEDTIME TABLET, FILM COATED ORAL ONCE A DAY
Qty: 30 TABLET | Refills: 11

## 2023-05-10 RX ORDER — DIPHENOXYLATE HYDROCHLORIDE AND ATROPINE SULFATE 2.5; .025 MG/1; MG/1
TAKE 1-2 TABLETS BY ORAL ROUTE 4 TIMES A DAY TABLET ORAL
Refills: 6
End: 2023-12-06

## 2023-06-27 ENCOUNTER — TELEPHONE ENCOUNTER (OUTPATIENT)
Dept: URBAN - NONMETROPOLITAN AREA CLINIC 2 | Facility: CLINIC | Age: 53
End: 2023-06-27

## 2023-06-28 ENCOUNTER — TELEPHONE ENCOUNTER (OUTPATIENT)
Dept: URBAN - NONMETROPOLITAN AREA CLINIC 2 | Facility: CLINIC | Age: 53
End: 2023-06-28

## 2023-06-28 RX ORDER — DIFENOXIN AND ATROPINE SULFATE .025; 1 MG/1; MG/1
1 TABLET AS NEEDED TABLET ORAL
Qty: 120 TABLET | Refills: 5
End: 2023-12-25

## 2023-10-23 ENCOUNTER — OFFICE VISIT (OUTPATIENT)
Dept: URBAN - NONMETROPOLITAN AREA CLINIC 13 | Facility: CLINIC | Age: 53
End: 2023-10-23

## 2023-10-23 RX ORDER — DIFENOXIN AND ATROPINE SULFATE .025; 1 MG/1; MG/1
1 TABLET AS NEEDED TABLET ORAL
Qty: 120 TABLET | Refills: 3 | OUTPATIENT

## 2023-10-23 RX ORDER — HYDROCODONE BITARTRATE AND ACETAMINOPHEN 10; 325 MG/1; MG/1
TAKE 1 TABLET BY ORAL ROUTE EVERY 6 HOURS AS NEEDED FOR PAIN TABLET ORAL
Qty: 0 | Refills: 0 | COMMUNITY
Start: 1900-01-01

## 2023-10-23 RX ORDER — LORAZEPAM 1 MG/1
TAKE 1 TABLET BY ORAL ROUTE 2 TIMES A DAY AS NEEDED TABLET ORAL 2
Qty: 0 | Refills: 0 | COMMUNITY
Start: 1900-01-01

## 2023-10-23 RX ORDER — KETOCONAZOLE 20 MG/G
APPLY TO THE AFFECTED AREA(S) BY TOPICAL ROUTE ONCE DAILY CREAM TOPICAL 1
Qty: 1 | Refills: 0 | COMMUNITY
Start: 1900-01-01

## 2023-10-23 RX ORDER — PROMETHAZINE HYDROCHLORIDE 6.25 MG/5ML
TAKE 5 MILLILITERS BY ORAL ROUTE 3 TIMES A DAY AS NEEDED SOLUTION ORAL
Qty: 0 | Refills: 0 | COMMUNITY
Start: 1900-01-01

## 2023-10-23 RX ORDER — DIFENOXIN AND ATROPINE SULFATE .025; 1 MG/1; MG/1
1 TABLET AS NEEDED TABLET ORAL
Qty: 120 TABLET | Refills: 5 | Status: ACTIVE | COMMUNITY
End: 2023-12-25

## 2023-10-23 RX ORDER — ZOLPIDEM TARTRATE 10 MG/1
TAKE 1 TABLET (10 MG) BY ORAL ROUTE ONCE DAILY AT BEDTIME TABLET, FILM COATED ORAL 1
Qty: 0 | Refills: 0 | COMMUNITY
Start: 1900-01-01

## 2023-10-23 RX ORDER — DICYCLOMINE HYDROCHLORIDE 10 MG/1
TAKE 1 CAPSULE (10 MG) BY ORAL ROUTE 3 TIMES PER DAY FOR 30 DAYS CAPSULE ORAL
Qty: 90 | Refills: 6 | Status: ACTIVE | COMMUNITY

## 2023-10-23 RX ORDER — AMITRIPTYLINE HYDROCHLORIDE 25 MG/1
1 TABLET AT BEDTIME TABLET, FILM COATED ORAL ONCE A DAY
Qty: 30 TABLET | Refills: 11

## 2023-10-23 RX ORDER — FUROSEMIDE 40 MG/1
TAKE 1 TABLET (40 MG) BY ORAL ROUTE 2 TIMES PER DAY TABLET ORAL 2
Qty: 0 | Refills: 0 | COMMUNITY
Start: 1900-01-01

## 2023-10-23 RX ORDER — POTASSIUM CHLORIDE 1500 MG/1
TAKE 1 TABLET (20 MEQ) BY ORAL ROUTE ONCE DAILY WITH FOOD TABLET, FILM COATED, EXTENDED RELEASE ORAL 1
Qty: 0 | Refills: 0 | COMMUNITY
Start: 1900-01-01

## 2023-10-23 RX ORDER — OMEPRAZOLE 20 MG/1
TAKE 1 CAPSULE (20 MG) BY ORAL ROUTE ONCE DAILY BEFORE A MEAL CAPSULE, DELAYED RELEASE ORAL 1
Qty: 0 | Refills: 0 | COMMUNITY
Start: 1900-01-01

## 2023-10-23 RX ORDER — TIZANIDINE HYDROCHLORIDE 4 MG/1
TAKE 1 CAPSULE (4 MG) BY ORAL ROUTE 3 TIMES PER DAY CAPSULE ORAL
Qty: 0 | Refills: 0 | COMMUNITY
Start: 1900-01-01

## 2023-10-23 RX ORDER — HYDRALAZINE HYDROCHLORIDE 50 MG/1
TAKE 1 TABLET BY ORAL ROUTE 3 TIMES A DAY TABLET ORAL
Qty: 0 | Refills: 0 | COMMUNITY
Start: 1900-01-01

## 2023-10-23 RX ORDER — HALOBETASOL PROPIONATE 0.5 MG/G
APPLY A THIN LAYER TO THE AFFECTED AREA(S) BY TOPICAL ROUTE ONCE DAILY CREAM TOPICAL 1
Qty: 1 | Refills: 0 | COMMUNITY
Start: 1900-01-01

## 2023-10-23 RX ORDER — GABAPENTIN 300 MG/1
TAKE 1 CAPSULE (300 MG) BY ORAL ROUTE 3 TIMES PER DAY CAPSULE ORAL
Qty: 0 | Refills: 0 | COMMUNITY
Start: 1900-01-01

## 2023-10-23 RX ORDER — DIPHENOXYLATE HYDROCHLORIDE AND ATROPINE SULFATE 2.5; .025 MG/1; MG/1
TAKE 1-2 TABLETS BY ORAL ROUTE 4 TIMES A DAY TABLET ORAL
Refills: 6 | Status: ACTIVE | COMMUNITY
End: 2023-12-06

## 2023-10-23 RX ORDER — CETIRIZINE HYDROCHLORIDE 10 MG/1
TAKE 1 TABLET (10 MG) BY ORAL ROUTE ONCE DAILY TABLET, FILM COATED ORAL 1
Qty: 0 | Refills: 0 | COMMUNITY
Start: 1900-01-01

## 2023-10-23 RX ORDER — PROPRANOLOL HYDROCHLORIDE 80 MG/1
TAKE 1 TABLET BY ORAL ROUTE DAILY TABLET ORAL 1
Qty: 0 | Refills: 0 | COMMUNITY
Start: 1900-01-01

## 2023-10-23 RX ORDER — CHOLESTYRAMINE 4 G/9G
TAKE 2 SCOOPS (8 GRAM) EACH SCOOP BEING DISSOLVED IN 2 TO 6 OUNCES OF WATER OR NONCARBONATED BEVERAGE BY PO BID POWDER, FOR SUSPENSION ORAL 2
Qty: 30 | Refills: 6

## 2023-10-23 RX ORDER — CHOLESTYRAMINE 4 G/9G
TAKE 2 SCOOPS (8 GRAM) POWDER, FOR SUSPENSION ORAL TWICE A DAY
Qty: 120 | Refills: 11 | Status: ACTIVE | COMMUNITY

## 2023-10-23 RX ORDER — OXYBUTYNIN CHLORIDE 5 MG/1
TAKE 1 TABLET BY ORAL ROUTE DAILY TABLET ORAL 1
Qty: 0 | Refills: 0 | COMMUNITY
Start: 1900-01-01

## 2023-10-23 RX ORDER — AMITRIPTYLINE HYDROCHLORIDE 25 MG/1
1 TABLET AT BEDTIME TABLET, FILM COATED ORAL ONCE A DAY
Qty: 30 TABLET | Refills: 11 | Status: ACTIVE | COMMUNITY

## 2023-10-23 RX ORDER — DICYCLOMINE HYDROCHLORIDE 10 MG/1
TAKE 1 CAPSULE (10 MG) BY ORAL ROUTE 3 TIMES PER DAY FOR 30 DAYS CAPSULE ORAL
Qty: 90 | Refills: 6

## 2023-10-23 RX ORDER — PROMETHAZINE HYDROCHLORIDE 25 MG/1
TAKE 1 TABLET (25 MG) BY ORAL ROUTE EVERY 4-6 HOURS AS NEEDED TABLET ORAL
Qty: 0 | Refills: 0 | COMMUNITY
Start: 1900-01-01

## 2023-10-23 RX ORDER — HYDROXYZINE HYDROCHLORIDE 25 MG/1
TAKE 1 TABLET BY ORAL ROUTE 4 TIMES A DAY TABLET, FILM COATED ORAL
Qty: 0 | Refills: 0 | COMMUNITY
Start: 1900-01-01

## 2023-10-23 RX ORDER — ALBUTEROL SULFATE 108 UG/1
INHALE 1 PUFF (90 MCG) BY INHALATION ROUTE EVERY 6 HOURS AS NEEDED AEROSOL, METERED RESPIRATORY (INHALATION)
Qty: 1 | Refills: 0 | COMMUNITY
Start: 1900-01-01

## 2023-10-23 NOTE — HPI-OTHER HISTORIES
History Of Present Illness    2019 Ms. Daphnie Riddle is here for f/u of chronic diarrhea and abdominal pain. She has a very complicated GI history. She has been treated by Dr. Garcia most recently. She had a colon perforation several years ago with subsequent right sided colon resection. She had an ostomy that was able to be reversed. She then had a ventral hernia that was repaired with mesh. She has had lower abdominal pain since. She had a CT scan with a low ventral hernia with a loop of SB. This is non obstructive at this time. She is taking bentyl with some improvement.  She has been having terrible diarrhea multiple times a day since her colon surgery.  She had normal stool studies and labs with a positive t-transglutaminase (tTG) IgG and elevated inflammatory markers. She had a normal EGD and colonoscopy with normal random bx. She is having some improvement with lomotil and cholestryramine. Her reflux is a little worse today. CS  2019 Ms. Riddle is here for f/u of diarrhea and GERD. Her cholestyramine was increased at her last OV. Her bowels are now fairly normal. She can now leave the house without worrying about having an accident. She continues to have some mid abdominal cramping. She is not taking the bentyl. She was started on zantac at her last OV and now her reflux is well controlled. Overall she is feeling better today. CS  2019 Ms. Riddle is here for diarrhea and abdominal pain. Her bowels are fairly normal with questran QID and lomotil prn. She has had to use the lomotil more frequently since her hernia surgery on the . She is in a lot of abdominal pain. This is around her ribs and incisions. She follows up with her surgeon 1/3. She is taking the bentyl 10mg BID. She has not noticed a change but not sure since she had surgery. Her reflux is not controlled on pepcid 20mg BID- worse at night. CS  3/9/2020 Ms. Riddle is here for f/u of abdominal pain and diarrhea. She was last seen in December after her hernia surgery. She saw Dr. Ritter soon after. She had a CT scan that showed inflammation of her colon along an area of a diverticuli. He ordered stool studies and was going to treat for diverticulitis if negative. She could not do the stool studies due to transportation. She was advised to f/u with us. She continues to have mid abdominal pain worse on the right midline. She continues to have diarrhea worse after eating despite choelstryramine and bentyl. She admits to very high stress. Her mother  last week and prior to that her aunt. 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 CS     Daphnie returns for follow up. She is doing ok  but still having pain. She has not had stool studies sent in as she has had three deaths in the family and dealing with children during the COVID issues. She is tolerating symptoms relatively well. Seems to be managing relatively well.  She has a lot of stress with her family in the house during this shelter at home time.  Her symptoms are tolerable but she is needing medications refilled.  She needs refills for lomoil, questran, AMT 10mg, bentyl.  I spent 7 minutes on telephone call as she could not get Televideo appointment set up properly   2020 Ms. Riddle is here for f/u of abdominal pain and diarrhea. She was last seen in April. She was doing ok on her medicaiton regimen of lomotil, questran, AMT, bentyl. She never completed the stool studies. She has had recurrent LUQ pain. She thinks she may have a hernia. Last CT scan showed possible diverticulitis. She has been under a lot of stress and her fiance passed away from a fall at work. She denies any changes in her weight. She denies any bleeding. CS   2020 Ms. Riddle is here for f/u of abdominal pain and diarrhea. At her last OV, she was having a lot of diarrhea and abdominal pain. She was treated for possible diverticulitis with cipro and flagyl. She had an increase in her stress level after the death of her fiance. Her AMT was increased to 25mg. Today, her abdominal pain is about the same. It was getting better while on the antibx and then returned as soon as they were completed. She denies any fevers. Her diarrhea is about the same. She is out of lomotil. When she takes 2 pills at a time this does help. She is also identifying triggers. CS   3/23/2021 Ms. Riddle is here for f/u of abdominal pain and diarrhea. Last year she had diverticulitis and treat with cipro and flagyl. Her pain has returned along with diarrhea. She remains on AMT 25mg, bentyl, questran, and lomotil. She denies any blood in her stool or melena. CS   2021 Ms. Riddle is here for f/u of abdominal pain and diarrhea. Last year she had diverticulitis and treat with cipro and flagyl. Her pain has returned along with diarrhea. She remains on AMT 25mg, bentyl, questran, and lomotil. She had a CT scan to r/o recurrent diveriticulitis. This was negative but it did show a verntal hernia with SB. Today, she reports the flagyl did not help. She continues to have pain and diarrhea. She is needed about 8 lomotil a day and is running out. She does not think BID questran is helping any longer. CS   2021 Ms. Riddle is here for f/u of abdominal pain and diarrhea. She has had recurrent abdominal pain. Her CT showed a ventral hernia with SB. She was referred to Dr Seaman and had surgery 2021. She has some drainage and slow incisional healing. She continues to have pain but this is slowly getting better. She is watching her diet.  She remains on AMT 25mg, bentyl, questran, and lomotil. Her diarrhea is fairly well controlled as long as she sticks to her medication schedule and watches for trigger foods. CS   2021 Ms. Riddle is evaluated via telehealth for f/u of abdominal pain and diarrhea. She had hernia surgey 2021. Her pain is better.  She remains on AMT 25mg, bentyl, questran, and lomotil. At her last OV, her diarrhea was fairly well controlled with meds and diet. Over the last month, her diarrhea has been worse. She is having to take about 8 lomotil a day with continued symptoms. She feels her stress and grieving during the holidays maybe contributing. CS   2021 Ms. Riddle is evaluated via telehealth for f/u of abdominal pain and diarrhea. At her last OV, she was taking AMT 25mg, bentyl, questran, and 8 lomotil with continued diarrhea. She was given a round of flagyl and changed from lomotil to motofen. She is taking 4 motofen a day. She is having about 2 BM in the morning and 2 in the evening. Overall, this is more manageable. CS  2022 Ms. Riddle is  here for f/u of abdominal pain and diarrhea. She is taking AMT 25mg, bentyl, questran, and 8 motofen. She denies any further diarrhea. She is able to eat and go shopping. She is no longer scared to leave the house. She is feeling well today. She has a grandbaby, boy due any day now. CS  2022 Ms. Riddle is  here for f/u of abdominal pain and diarrhea. She is taking AMT 25mg, bentyl, questran, and 8 motofen. Her pharmacy has been having trouble getting her questran. This has caused her diarrhea to return. She has had to use up to 12 motofen a day. She is under more stress and this is not helping. She is starting to have pain again and wonders if her hernia is back. She was told by Dr Seaman it would return if she did not stop smoking. CS  2023 Ms. Riddle is evaluated via telehealth for f/u of abdominal pain and diarrhea. She is taking AMT 25mg, bentyl, questran, and 8 motofen. She continues to have diarrhea and abdominal pain. Her pain is worse lately. She has an abdominal hernia and she feels it has gotten bigger. She was evaluated last year for this and Dr Seaman wanter her to stop smoking first. She is no longer smoking or only very little. She is under a lot of stress and knows this is likely contributing.. CS

## 2023-10-23 NOTE — HPI-TODAY'S VISIT:
10/23/2023 Ms. Riddle is  evaluated via telehealth for f/u of abdominal pain and diarrhea. She is taking AMT 25mg, bentyl, questran, and 8 motofen. She continues to have diarrhea and abdominal pain. Her pain is worse lately. She has an abdominal hernia and she feels it has gotten bigger. She was evaluated last year for this and Dr Seaman wanter her to stop smoking first. She is no longer smoking or only very little. She is under a lot of stress and knows this is likely contributing.. CS

## 2023-10-30 ENCOUNTER — TELEPHONE ENCOUNTER (OUTPATIENT)
Dept: URBAN - NONMETROPOLITAN AREA CLINIC 13 | Facility: CLINIC | Age: 53
End: 2023-10-30

## 2023-10-31 ENCOUNTER — TELEPHONE ENCOUNTER (OUTPATIENT)
Dept: URBAN - NONMETROPOLITAN AREA CLINIC 13 | Facility: CLINIC | Age: 53
End: 2023-10-31

## 2023-12-01 ENCOUNTER — TELEPHONE ENCOUNTER (OUTPATIENT)
Dept: URBAN - NONMETROPOLITAN AREA CLINIC 2 | Facility: CLINIC | Age: 53
End: 2023-12-01

## 2023-12-01 RX ORDER — DIFENOXIN AND ATROPINE SULFATE .025; 1 MG/1; MG/1
1 TABLET AS NEEDED TABLET ORAL
Qty: 120 TABLET | Refills: 3
End: 2024-03-30

## 2023-12-14 ENCOUNTER — OFFICE VISIT (OUTPATIENT)
Dept: URBAN - NONMETROPOLITAN AREA CLINIC 13 | Facility: CLINIC | Age: 53
End: 2023-12-14
Payer: COMMERCIAL

## 2023-12-14 ENCOUNTER — LAB OUTSIDE AN ENCOUNTER (OUTPATIENT)
Dept: URBAN - NONMETROPOLITAN AREA CLINIC 13 | Facility: CLINIC | Age: 53
End: 2023-12-14

## 2023-12-14 ENCOUNTER — TELEPHONE ENCOUNTER (OUTPATIENT)
Dept: URBAN - NONMETROPOLITAN AREA CLINIC 2 | Facility: CLINIC | Age: 53
End: 2023-12-14

## 2023-12-14 VITALS
BODY MASS INDEX: 31.21 KG/M2 | DIASTOLIC BLOOD PRESSURE: 78 MMHG | HEART RATE: 59 BPM | WEIGHT: 218 LBS | HEIGHT: 70 IN | SYSTOLIC BLOOD PRESSURE: 158 MMHG

## 2023-12-14 DIAGNOSIS — K21.9 GERD (GASTROESOPHAGEAL REFLUX DISEASE): ICD-10-CM

## 2023-12-14 DIAGNOSIS — R10.9 ABDOMINAL PAIN: ICD-10-CM

## 2023-12-14 DIAGNOSIS — R19.7 DIARRHEA: ICD-10-CM

## 2023-12-14 PROCEDURE — 99214 OFFICE O/P EST MOD 30 MIN: CPT | Performed by: NURSE PRACTITIONER

## 2023-12-14 RX ORDER — AMITRIPTYLINE HYDROCHLORIDE 25 MG/1
1 TABLET AT BEDTIME TABLET, FILM COATED ORAL ONCE A DAY
Qty: 30 TABLET | Refills: 11

## 2023-12-14 RX ORDER — DICYCLOMINE HYDROCHLORIDE 10 MG/1
TAKE 1 CAPSULE (10 MG) BY ORAL ROUTE 3 TIMES PER DAY FOR 30 DAYS CAPSULE ORAL
Qty: 90 | Refills: 6 | Status: ACTIVE | COMMUNITY

## 2023-12-14 RX ORDER — ZOLPIDEM TARTRATE 10 MG/1
TAKE 1 TABLET (10 MG) BY ORAL ROUTE ONCE DAILY AT BEDTIME TABLET, FILM COATED ORAL 1
Qty: 0 | Refills: 0 | COMMUNITY
Start: 1900-01-01

## 2023-12-14 RX ORDER — DIFENOXIN AND ATROPINE SULFATE .025; 1 MG/1; MG/1
1 TABLET AS NEEDED TABLET ORAL
Qty: 120 TABLET | Refills: 3 | Status: ACTIVE | COMMUNITY
End: 2024-03-30

## 2023-12-14 RX ORDER — PROMETHAZINE HYDROCHLORIDE 25 MG/1
TAKE 1 TABLET (25 MG) BY ORAL ROUTE EVERY 4-6 HOURS AS NEEDED TABLET ORAL
Qty: 0 | Refills: 0 | COMMUNITY
Start: 1900-01-01

## 2023-12-14 RX ORDER — POTASSIUM CHLORIDE 1500 MG/1
TAKE 1 TABLET (20 MEQ) BY ORAL ROUTE ONCE DAILY WITH FOOD TABLET, FILM COATED, EXTENDED RELEASE ORAL 1
Qty: 0 | Refills: 0 | COMMUNITY
Start: 1900-01-01

## 2023-12-14 RX ORDER — DICYCLOMINE HYDROCHLORIDE 10 MG/1
TAKE 1 CAPSULE (10 MG) BY ORAL ROUTE 3 TIMES PER DAY FOR 30 DAYS CAPSULE ORAL
Qty: 90 | Refills: 6

## 2023-12-14 RX ORDER — KETOCONAZOLE 20 MG/G
APPLY TO THE AFFECTED AREA(S) BY TOPICAL ROUTE ONCE DAILY CREAM TOPICAL 1
Qty: 1 | Refills: 0 | COMMUNITY
Start: 1900-01-01

## 2023-12-14 RX ORDER — PROPRANOLOL HYDROCHLORIDE 80 MG/1
TAKE 1 TABLET BY ORAL ROUTE DAILY TABLET ORAL 1
Qty: 0 | Refills: 0 | COMMUNITY
Start: 1900-01-01

## 2023-12-14 RX ORDER — GABAPENTIN 300 MG/1
TAKE 1 CAPSULE (300 MG) BY ORAL ROUTE 3 TIMES PER DAY CAPSULE ORAL
Qty: 0 | Refills: 0 | COMMUNITY
Start: 1900-01-01

## 2023-12-14 RX ORDER — OMEPRAZOLE 20 MG/1
TAKE 1 CAPSULE (20 MG) BY ORAL ROUTE ONCE DAILY BEFORE A MEAL CAPSULE, DELAYED RELEASE ORAL 1
Qty: 0 | Refills: 0 | COMMUNITY
Start: 1900-01-01

## 2023-12-14 RX ORDER — FUROSEMIDE 40 MG/1
TAKE 1 TABLET (40 MG) BY ORAL ROUTE 2 TIMES PER DAY TABLET ORAL 2
Qty: 0 | Refills: 0 | COMMUNITY
Start: 1900-01-01

## 2023-12-14 RX ORDER — HYDROXYZINE HYDROCHLORIDE 25 MG/1
TAKE 1 TABLET BY ORAL ROUTE 4 TIMES A DAY TABLET, FILM COATED ORAL
Qty: 0 | Refills: 0 | COMMUNITY
Start: 1900-01-01

## 2023-12-14 RX ORDER — CETIRIZINE HYDROCHLORIDE 10 MG/1
TAKE 1 TABLET (10 MG) BY ORAL ROUTE ONCE DAILY TABLET, FILM COATED ORAL 1
Qty: 0 | Refills: 0 | COMMUNITY
Start: 1900-01-01

## 2023-12-14 RX ORDER — TIZANIDINE HYDROCHLORIDE 4 MG/1
TAKE 1 CAPSULE (4 MG) BY ORAL ROUTE 3 TIMES PER DAY CAPSULE ORAL
Qty: 0 | Refills: 0 | COMMUNITY
Start: 1900-01-01

## 2023-12-14 RX ORDER — ALBUTEROL SULFATE 108 UG/1
INHALE 1 PUFF (90 MCG) BY INHALATION ROUTE EVERY 6 HOURS AS NEEDED AEROSOL, METERED RESPIRATORY (INHALATION)
Qty: 1 | Refills: 0 | COMMUNITY
Start: 1900-01-01

## 2023-12-14 RX ORDER — METRONIDAZOLE 500 MG/1
1 TABLET TABLET, FILM COATED ORAL THREE TIMES A DAY
Qty: 42 TABLET | Refills: 0
Start: 2023-04-21 | End: 2023-12-28

## 2023-12-14 RX ORDER — LORAZEPAM 1 MG/1
TAKE 1 TABLET BY ORAL ROUTE 2 TIMES A DAY AS NEEDED TABLET ORAL 2
Qty: 0 | Refills: 0 | COMMUNITY
Start: 1900-01-01

## 2023-12-14 RX ORDER — CHOLESTYRAMINE 4 G/9G
TAKE 2 SCOOPS (8 GRAM) EACH SCOOP BEING DISSOLVED IN 2 TO 6 OUNCES OF WATER OR NONCARBONATED BEVERAGE BY PO BID POWDER, FOR SUSPENSION ORAL 2
Qty: 30 | Refills: 6 | Status: ACTIVE | COMMUNITY

## 2023-12-14 RX ORDER — HYDROCODONE BITARTRATE AND ACETAMINOPHEN 10; 325 MG/1; MG/1
TAKE 1 TABLET BY ORAL ROUTE EVERY 6 HOURS AS NEEDED FOR PAIN TABLET ORAL
Qty: 0 | Refills: 0 | COMMUNITY
Start: 1900-01-01

## 2023-12-14 RX ORDER — HALOBETASOL PROPIONATE 0.5 MG/G
APPLY A THIN LAYER TO THE AFFECTED AREA(S) BY TOPICAL ROUTE ONCE DAILY CREAM TOPICAL 1
Qty: 1 | Refills: 0 | COMMUNITY
Start: 1900-01-01

## 2023-12-14 RX ORDER — CHOLESTYRAMINE 4 G/9G
TAKE 2 SCOOPS (8 GRAM) EACH SCOOP BEING DISSOLVED IN 2 TO 6 OUNCES OF WATER OR NONCARBONATED BEVERAGE BY PO TWICE A DAY POWDER, FOR SUSPENSION ORAL
Qty: 1 | Refills: 11

## 2023-12-14 RX ORDER — OXYBUTYNIN CHLORIDE 5 MG/1
TAKE 1 TABLET BY ORAL ROUTE DAILY TABLET ORAL 1
Qty: 0 | Refills: 0 | COMMUNITY
Start: 1900-01-01

## 2023-12-14 RX ORDER — HYDRALAZINE HYDROCHLORIDE 50 MG/1
TAKE 1 TABLET BY ORAL ROUTE 3 TIMES A DAY TABLET ORAL
Qty: 0 | Refills: 0 | COMMUNITY
Start: 1900-01-01

## 2023-12-14 RX ORDER — AMITRIPTYLINE HYDROCHLORIDE 25 MG/1
1 TABLET AT BEDTIME TABLET, FILM COATED ORAL ONCE A DAY
Qty: 30 TABLET | Refills: 11 | Status: ACTIVE | COMMUNITY

## 2023-12-14 RX ORDER — DIFENOXIN AND ATROPINE SULFATE .025; 1 MG/1; MG/1
1 TABLET AS NEEDED TABLET ORAL
Qty: 120 TABLET | Refills: 3 | OUTPATIENT

## 2023-12-14 RX ORDER — PROMETHAZINE HYDROCHLORIDE 6.25 MG/5ML
TAKE 5 MILLILITERS BY ORAL ROUTE 3 TIMES A DAY AS NEEDED SOLUTION ORAL
Qty: 0 | Refills: 0 | COMMUNITY
Start: 1900-01-01
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
## 2024-03-21 ENCOUNTER — OV EP (OUTPATIENT)
Dept: URBAN - NONMETROPOLITAN AREA CLINIC 13 | Facility: CLINIC | Age: 54
End: 2024-03-21
Payer: COMMERCIAL

## 2024-03-21 VITALS
WEIGHT: 215.4 LBS | SYSTOLIC BLOOD PRESSURE: 142 MMHG | BODY MASS INDEX: 30.84 KG/M2 | HEIGHT: 70 IN | HEART RATE: 88 BPM | DIASTOLIC BLOOD PRESSURE: 82 MMHG

## 2024-03-21 DIAGNOSIS — R19.7 DIARRHEA: ICD-10-CM

## 2024-03-21 DIAGNOSIS — R10.9 ABDOMINAL PAIN: ICD-10-CM

## 2024-03-21 DIAGNOSIS — K21.9 GERD (GASTROESOPHAGEAL REFLUX DISEASE): ICD-10-CM

## 2024-03-21 PROCEDURE — 99213 OFFICE O/P EST LOW 20 MIN: CPT | Performed by: NURSE PRACTITIONER

## 2024-03-21 RX ORDER — HYDRALAZINE HYDROCHLORIDE 50 MG/1
TAKE 1 TABLET BY ORAL ROUTE 3 TIMES A DAY TABLET ORAL
Qty: 0 | Refills: 0 | COMMUNITY
Start: 1900-01-01

## 2024-03-21 RX ORDER — HYDROXYZINE HYDROCHLORIDE 25 MG/1
TAKE 1 TABLET BY ORAL ROUTE 4 TIMES A DAY TABLET, FILM COATED ORAL
Qty: 0 | Refills: 0 | COMMUNITY
Start: 1900-01-01

## 2024-03-21 RX ORDER — FUROSEMIDE 40 MG/1
TAKE 1 TABLET (40 MG) BY ORAL ROUTE 2 TIMES PER DAY TABLET ORAL 2
Qty: 0 | Refills: 0 | COMMUNITY
Start: 1900-01-01

## 2024-03-21 RX ORDER — ALBUTEROL SULFATE 108 UG/1
INHALE 1 PUFF (90 MCG) BY INHALATION ROUTE EVERY 6 HOURS AS NEEDED AEROSOL, METERED RESPIRATORY (INHALATION)
Qty: 1 | Refills: 0 | COMMUNITY
Start: 1900-01-01

## 2024-03-21 RX ORDER — DIFENOXIN AND ATROPINE SULFATE .025; 1 MG/1; MG/1
1 TABLET AS NEEDED TABLET ORAL
Qty: 120 TABLET | Refills: 3 | Status: ACTIVE | COMMUNITY

## 2024-03-21 RX ORDER — DICYCLOMINE HYDROCHLORIDE 10 MG/1
TAKE 1 CAPSULE (10 MG) BY ORAL ROUTE 3 TIMES PER DAY FOR 30 DAYS CAPSULE ORAL
Qty: 90 | Refills: 6 | Status: ACTIVE | COMMUNITY

## 2024-03-21 RX ORDER — TIZANIDINE HYDROCHLORIDE 4 MG/1
TAKE 1 CAPSULE (4 MG) BY ORAL ROUTE 3 TIMES PER DAY CAPSULE ORAL
Qty: 0 | Refills: 0 | COMMUNITY
Start: 1900-01-01

## 2024-03-21 RX ORDER — HALOBETASOL PROPIONATE 0.5 MG/G
APPLY A THIN LAYER TO THE AFFECTED AREA(S) BY TOPICAL ROUTE ONCE DAILY CREAM TOPICAL 1
Qty: 1 | Refills: 0 | COMMUNITY
Start: 1900-01-01

## 2024-03-21 RX ORDER — OXYBUTYNIN CHLORIDE 5 MG/1
TAKE 1 TABLET BY ORAL ROUTE DAILY TABLET ORAL 1
Qty: 0 | Refills: 0 | COMMUNITY
Start: 1900-01-01

## 2024-03-21 RX ORDER — OMEPRAZOLE 20 MG/1
TAKE 1 CAPSULE (20 MG) BY ORAL ROUTE ONCE DAILY BEFORE A MEAL CAPSULE, DELAYED RELEASE ORAL 1
Qty: 0 | Refills: 0 | COMMUNITY
Start: 1900-01-01

## 2024-03-21 RX ORDER — CHOLESTYRAMINE 4 G/9G
TAKE 2 SCOOPS (8 GRAM) EACH SCOOP BEING DISSOLVED IN 2 TO 6 OUNCES OF WATER OR NONCARBONATED BEVERAGE BY PO TWICE A DAY POWDER, FOR SUSPENSION ORAL
Qty: 1 | Refills: 11 | Status: ACTIVE | COMMUNITY

## 2024-03-21 RX ORDER — PROMETHAZINE HYDROCHLORIDE 6.25 MG/5ML
TAKE 5 MILLILITERS BY ORAL ROUTE 3 TIMES A DAY AS NEEDED SOLUTION ORAL
Qty: 0 | Refills: 0 | COMMUNITY
Start: 1900-01-01

## 2024-03-21 RX ORDER — HYDROCODONE BITARTRATE AND ACETAMINOPHEN 10; 325 MG/1; MG/1
TAKE 1 TABLET BY ORAL ROUTE EVERY 6 HOURS AS NEEDED FOR PAIN TABLET ORAL
Qty: 0 | Refills: 0 | COMMUNITY
Start: 1900-01-01

## 2024-03-21 RX ORDER — PROPRANOLOL HYDROCHLORIDE 80 MG/1
TAKE 1 TABLET BY ORAL ROUTE DAILY TABLET ORAL 1
Qty: 0 | Refills: 0 | COMMUNITY
Start: 1900-01-01

## 2024-03-21 RX ORDER — CHOLESTYRAMINE 4 G/9G
TAKE 2 SCOOPS (8 GRAM) EACH SCOOP BEING DISSOLVED IN 2 TO 6 OUNCES OF WATER OR NONCARBONATED BEVERAGE BY PO TWICE A DAY POWDER, FOR SUSPENSION ORAL
Qty: 1 | Refills: 11

## 2024-03-21 RX ORDER — DIFENOXIN AND ATROPINE SULFATE .025; 1 MG/1; MG/1
1 TABLET AS NEEDED TABLET ORAL
Qty: 120 TABLET | Refills: 3 | OUTPATIENT

## 2024-03-21 RX ORDER — CETIRIZINE HYDROCHLORIDE 10 MG/1
TAKE 1 TABLET (10 MG) BY ORAL ROUTE ONCE DAILY TABLET, FILM COATED ORAL 1
Qty: 0 | Refills: 0 | COMMUNITY
Start: 1900-01-01

## 2024-03-21 RX ORDER — AMITRIPTYLINE HYDROCHLORIDE 25 MG/1
1 TABLET AT BEDTIME TABLET, FILM COATED ORAL ONCE A DAY
Qty: 30 TABLET | Refills: 11 | Status: ACTIVE | COMMUNITY

## 2024-03-21 RX ORDER — GABAPENTIN 300 MG/1
TAKE 1 CAPSULE (300 MG) BY ORAL ROUTE 3 TIMES PER DAY CAPSULE ORAL
Qty: 0 | Refills: 0 | COMMUNITY
Start: 1900-01-01

## 2024-03-21 RX ORDER — LORAZEPAM 1 MG/1
TAKE 1 TABLET BY ORAL ROUTE 2 TIMES A DAY AS NEEDED TABLET ORAL 2
Qty: 0 | Refills: 0 | COMMUNITY
Start: 1900-01-01

## 2024-03-21 RX ORDER — ZOLPIDEM TARTRATE 10 MG/1
TAKE 1 TABLET (10 MG) BY ORAL ROUTE ONCE DAILY AT BEDTIME TABLET, FILM COATED ORAL 1
Qty: 0 | Refills: 0 | COMMUNITY
Start: 1900-01-01

## 2024-03-21 RX ORDER — KETOCONAZOLE 20 MG/G
APPLY TO THE AFFECTED AREA(S) BY TOPICAL ROUTE ONCE DAILY CREAM TOPICAL 1
Qty: 1 | Refills: 0 | COMMUNITY
Start: 1900-01-01

## 2024-03-21 RX ORDER — POTASSIUM CHLORIDE 1500 MG/1
TAKE 1 TABLET (20 MEQ) BY ORAL ROUTE ONCE DAILY WITH FOOD TABLET, FILM COATED, EXTENDED RELEASE ORAL 1
Qty: 0 | Refills: 0 | COMMUNITY
Start: 1900-01-01

## 2024-03-21 RX ORDER — AMITRIPTYLINE HYDROCHLORIDE 25 MG/1
1 TABLET AT BEDTIME TABLET, FILM COATED ORAL ONCE A DAY
Qty: 30 TABLET | Refills: 11

## 2024-03-21 RX ORDER — PROMETHAZINE HYDROCHLORIDE 25 MG/1
TAKE 1 TABLET (25 MG) BY ORAL ROUTE EVERY 4-6 HOURS AS NEEDED TABLET ORAL
Qty: 0 | Refills: 0 | COMMUNITY
Start: 1900-01-01

## 2024-03-21 RX ORDER — DICYCLOMINE HYDROCHLORIDE 10 MG/1
TAKE 1 CAPSULE (10 MG) BY ORAL ROUTE 3 TIMES PER DAY FOR 30 DAYS CAPSULE ORAL
Qty: 90 | Refills: 6
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
## 2024-03-21 NOTE — HPI-TODAY'S VISIT:
3/21/2024 Ms. Riddle is  here for f/u of abdominal pain and diarrhea. She is taking AMT 50mg, bentyl, questran, and 8 motofen. She continues to have diarrhea and abdominal pain but understanding this is related to her stressful living situation. She is hoping to move to an apartment next month. She is trying to cut back on smoking. Her CT did not show any acute findings. CS

## 2024-06-13 ENCOUNTER — TELEPHONE ENCOUNTER (OUTPATIENT)
Dept: URBAN - NONMETROPOLITAN AREA CLINIC 13 | Facility: CLINIC | Age: 54
End: 2024-06-13

## 2024-06-21 ENCOUNTER — OFFICE VISIT (OUTPATIENT)
Dept: URBAN - NONMETROPOLITAN AREA CLINIC 13 | Facility: CLINIC | Age: 54
End: 2024-06-21

## 2024-08-05 ENCOUNTER — ERX REFILL RESPONSE (OUTPATIENT)
Dept: URBAN - NONMETROPOLITAN AREA CLINIC 2 | Facility: CLINIC | Age: 54
End: 2024-08-05

## 2024-08-05 RX ORDER — CHOLESTYRAMINE 4 G/9G
TAKE 2 SCOOPS (8 GRAM) EACH SCOOP BEING DISSOLVED IN 2 TO 6 OUNCES OF WATER OR NONCARBONATED BEVERAGE BY PO TWICE A DAY POWDER, FOR SUSPENSION ORAL
Qty: 1 | Refills: 11 | OUTPATIENT

## 2024-08-16 ENCOUNTER — OFFICE VISIT (OUTPATIENT)
Dept: URBAN - NONMETROPOLITAN AREA CLINIC 13 | Facility: CLINIC | Age: 54
End: 2024-08-16

## 2024-08-16 RX ORDER — DICYCLOMINE HYDROCHLORIDE 10 MG/1
TAKE 1 CAPSULE (10 MG) BY ORAL ROUTE 3 TIMES PER DAY FOR 30 DAYS CAPSULE ORAL
Qty: 90 | Refills: 6

## 2024-08-16 RX ORDER — DIFENOXIN AND ATROPINE SULFATE .025; 1 MG/1; MG/1
1 TABLET AS NEEDED TABLET ORAL
Qty: 120 TABLET | Refills: 3 | Status: ACTIVE | COMMUNITY

## 2024-08-16 RX ORDER — LORAZEPAM 1 MG/1
TAKE 1 TABLET BY ORAL ROUTE 2 TIMES A DAY AS NEEDED TABLET ORAL 2
Qty: 0 | Refills: 0 | COMMUNITY
Start: 1900-01-01

## 2024-08-16 RX ORDER — OMEPRAZOLE 20 MG/1
TAKE 1 CAPSULE (20 MG) BY ORAL ROUTE ONCE DAILY BEFORE A MEAL CAPSULE, DELAYED RELEASE ORAL 1
Qty: 0 | Refills: 0 | COMMUNITY
Start: 1900-01-01

## 2024-08-16 RX ORDER — CHOLESTYRAMINE 4 G/9G
TAKE 2 SCOOPS (8 GRAM) EACH SCOOP BEING DISSOLVED IN 2 TO 6 OUNCES OF WATER OR NONCARBONATED BEVERAGE BY PO TWICE A DAY POWDER, FOR SUSPENSION ORAL
Qty: 1 | Refills: 11 | Status: ACTIVE | COMMUNITY

## 2024-08-16 RX ORDER — CETIRIZINE HYDROCHLORIDE 10 MG/1
TAKE 1 TABLET (10 MG) BY ORAL ROUTE ONCE DAILY TABLET, FILM COATED ORAL 1
Qty: 0 | Refills: 0 | COMMUNITY
Start: 1900-01-01

## 2024-08-16 RX ORDER — HALOBETASOL PROPIONATE 0.5 MG/G
APPLY A THIN LAYER TO THE AFFECTED AREA(S) BY TOPICAL ROUTE ONCE DAILY CREAM TOPICAL 1
Qty: 1 | Refills: 0 | COMMUNITY
Start: 1900-01-01

## 2024-08-16 RX ORDER — DIFENOXIN AND ATROPINE SULFATE .025; 1 MG/1; MG/1
1 TABLET AS NEEDED TABLET ORAL
Qty: 120 TABLET | Refills: 3 | OUTPATIENT

## 2024-08-16 RX ORDER — TIZANIDINE HYDROCHLORIDE 4 MG/1
TAKE 1 CAPSULE (4 MG) BY ORAL ROUTE 3 TIMES PER DAY CAPSULE ORAL
Qty: 0 | Refills: 0 | COMMUNITY
Start: 1900-01-01

## 2024-08-16 RX ORDER — AMITRIPTYLINE HYDROCHLORIDE 25 MG/1
1 TABLET AT BEDTIME TABLET, FILM COATED ORAL ONCE A DAY
Qty: 30 TABLET | Refills: 11

## 2024-08-16 RX ORDER — PROPRANOLOL HYDROCHLORIDE 80 MG/1
TAKE 1 TABLET BY ORAL ROUTE DAILY TABLET ORAL 1
Qty: 0 | Refills: 0 | COMMUNITY
Start: 1900-01-01

## 2024-08-16 RX ORDER — DICYCLOMINE HYDROCHLORIDE 10 MG/1
TAKE 1 CAPSULE (10 MG) BY ORAL ROUTE 3 TIMES PER DAY FOR 30 DAYS CAPSULE ORAL
Qty: 90 | Refills: 6 | Status: ACTIVE | COMMUNITY

## 2024-08-16 RX ORDER — ZOLPIDEM TARTRATE 10 MG/1
TAKE 1 TABLET (10 MG) BY ORAL ROUTE ONCE DAILY AT BEDTIME TABLET, FILM COATED ORAL 1
Qty: 0 | Refills: 0 | COMMUNITY
Start: 1900-01-01

## 2024-08-16 RX ORDER — ALBUTEROL SULFATE 108 UG/1
INHALE 1 PUFF (90 MCG) BY INHALATION ROUTE EVERY 6 HOURS AS NEEDED AEROSOL, METERED RESPIRATORY (INHALATION)
Qty: 1 | Refills: 0 | COMMUNITY
Start: 1900-01-01

## 2024-08-16 RX ORDER — CHOLESTYRAMINE 4 G/9G
TAKE 2 SCOOPS (8 GRAM) EACH SCOOP BEING DISSOLVED IN 2 TO 6 OUNCES OF WATER OR NONCARBONATED BEVERAGE BY PO TWICE A DAY POWDER, FOR SUSPENSION ORAL
Qty: 1 | Refills: 11

## 2024-08-16 RX ORDER — HYDROXYZINE HYDROCHLORIDE 25 MG/1
TAKE 1 TABLET BY ORAL ROUTE 4 TIMES A DAY TABLET, FILM COATED ORAL
Qty: 0 | Refills: 0 | COMMUNITY
Start: 1900-01-01

## 2024-08-16 RX ORDER — AMITRIPTYLINE HYDROCHLORIDE 25 MG/1
1 TABLET AT BEDTIME TABLET, FILM COATED ORAL ONCE A DAY
Qty: 30 TABLET | Refills: 11 | Status: ACTIVE | COMMUNITY

## 2024-08-16 RX ORDER — FUROSEMIDE 40 MG/1
TAKE 1 TABLET (40 MG) BY ORAL ROUTE 2 TIMES PER DAY TABLET ORAL 2
Qty: 0 | Refills: 0 | COMMUNITY
Start: 1900-01-01

## 2024-08-16 RX ORDER — HYDRALAZINE HYDROCHLORIDE 50 MG/1
TAKE 1 TABLET BY ORAL ROUTE 3 TIMES A DAY TABLET ORAL
Qty: 0 | Refills: 0 | COMMUNITY
Start: 1900-01-01

## 2024-08-16 RX ORDER — KETOCONAZOLE 20 MG/G
APPLY TO THE AFFECTED AREA(S) BY TOPICAL ROUTE ONCE DAILY CREAM TOPICAL 1
Qty: 1 | Refills: 0 | COMMUNITY
Start: 1900-01-01

## 2024-08-16 RX ORDER — HYDROCODONE BITARTRATE AND ACETAMINOPHEN 10; 325 MG/1; MG/1
TAKE 1 TABLET BY ORAL ROUTE EVERY 6 HOURS AS NEEDED FOR PAIN TABLET ORAL
Qty: 0 | Refills: 0 | COMMUNITY
Start: 1900-01-01

## 2024-08-16 RX ORDER — PROMETHAZINE HYDROCHLORIDE 6.25 MG/5ML
TAKE 5 MILLILITERS BY ORAL ROUTE 3 TIMES A DAY AS NEEDED SOLUTION ORAL
Qty: 0 | Refills: 0 | COMMUNITY
Start: 1900-01-01

## 2024-08-16 RX ORDER — OXYBUTYNIN CHLORIDE 5 MG/1
TAKE 1 TABLET BY ORAL ROUTE DAILY TABLET ORAL 1
Qty: 0 | Refills: 0 | COMMUNITY
Start: 1900-01-01

## 2024-08-16 RX ORDER — GABAPENTIN 300 MG/1
TAKE 1 CAPSULE (300 MG) BY ORAL ROUTE 3 TIMES PER DAY CAPSULE ORAL
Qty: 0 | Refills: 0 | COMMUNITY
Start: 1900-01-01

## 2024-08-16 RX ORDER — POTASSIUM CHLORIDE 1500 MG/1
TAKE 1 TABLET (20 MEQ) BY ORAL ROUTE ONCE DAILY WITH FOOD TABLET, FILM COATED, EXTENDED RELEASE ORAL 1
Qty: 0 | Refills: 0 | COMMUNITY
Start: 1900-01-01

## 2024-08-16 RX ORDER — PROMETHAZINE HYDROCHLORIDE 25 MG/1
TAKE 1 TABLET (25 MG) BY ORAL ROUTE EVERY 4-6 HOURS AS NEEDED TABLET ORAL
Qty: 0 | Refills: 0 | COMMUNITY
Start: 1900-01-01

## 2024-08-16 NOTE — HPI-TODAY'S VISIT:
8/16/2024 Ms. Riddle is  here for f/u of abdominal pain and diarrhea. She is taking AMT 50mg, bentyl, questran, and 8 motofen. She continues to have diarrhea and abdominal pain but understanding this is related to her stressful living situation. She is hoping to move to an apartment next month. She is trying to cut back on smoking. Her CT did not show any acute findings. CS
panic attacks

## 2024-08-16 NOTE — PHYSICAL EXAM HENT:
Forms dropped off for Dr. Rodriguez, placed in inbox above Dr. Bertrand' desk. Please complete and place in outbox, then route message back to CHING REYNA for TC to contact patient.    Darlin Humphrey,      Head,  normocephalic,  atraumatic,  Face,  Face within normal limits,  Ears,  External ears within normal limits,  Nose/Nasopharynx,  External nose  normal appearance,  Lips,  Appearance normal
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
## 2024-09-17 ENCOUNTER — DASHBOARD ENCOUNTERS (OUTPATIENT)
Age: 54
End: 2024-09-17

## 2024-09-17 ENCOUNTER — OFFICE VISIT (OUTPATIENT)
Dept: URBAN - NONMETROPOLITAN AREA CLINIC 13 | Facility: CLINIC | Age: 54
End: 2024-09-17
Payer: COMMERCIAL

## 2024-09-17 ENCOUNTER — LAB OUTSIDE AN ENCOUNTER (OUTPATIENT)
Dept: URBAN - NONMETROPOLITAN AREA CLINIC 2 | Facility: CLINIC | Age: 54
End: 2024-09-17

## 2024-09-17 VITALS
HEART RATE: 94 BPM | HEIGHT: 70 IN | DIASTOLIC BLOOD PRESSURE: 86 MMHG | BODY MASS INDEX: 38.22 KG/M2 | WEIGHT: 267 LBS | SYSTOLIC BLOOD PRESSURE: 163 MMHG

## 2024-09-17 DIAGNOSIS — R19.7 DIARRHEA: ICD-10-CM

## 2024-09-17 DIAGNOSIS — R10.84 ABDOMINAL CRAMPING, GENERALIZED: ICD-10-CM

## 2024-09-17 DIAGNOSIS — K21.9 GERD (GASTROESOPHAGEAL REFLUX DISEASE): ICD-10-CM

## 2024-09-17 LAB
A/G RATIO: 1.1
ALBUMIN: 3.9
ALKALINE PHOSPHATASE: 84
ALT (SGPT): 12
ANION GAP: 10
AST (SGOT): 19
BASOPHILS AUTOMATED ABSOLUTE COUNT: 0
BASOPHILS RELATIVE PERCENT: 0.7
BILIRUBIN TOTAL: 0.3
BLOOD UREA NITROGEN: 12
BUN / CREAT RATIO: 16
C-REACTIVE PROTEIN: 0.89
CALCIUM: 9.5
CHLORIDE: 102
CO2: 30
CREATININE, SERUM: 0.74
EGFR (CKD-EPI): >60
EOSINOPHILS AUTOMATED ABSOLUTE COUNT: 0.1
EOSINOPHILS RELATIVE PERCENT: 1.7
GLUCOSE: 127
HEMATOCRIT: 39.3
HEMOGLOBIN: 13.3
LYMPHOCYTES AUTOMATED ABSOLUTE COUNT: 1.5
LYMPHOCYTES RELATIVE PERCENT: 25.7
MEAN CORPUSCULAR HEMOGLOBIN CONC: 33.9
MEAN CORPUSCULAR HEMOGLOBIN: 29.8
MEAN CORPUSCULAR VOLUME: 87.8
MEAN PLATELET VOLUME: 8.5
MONOCYTES AUTOMATED ABSOLUTE COUNT: 0.3
MONOCYTES RELATIVE PERCENT: 5.8
NEUTROPHILS AUTOMATED ABSOLUTE: 3.9
NEUTROPHILS RELATIVE PERCENT: 66.1
PLATELET COUNT: 165
POTASSIUM: 4.7
PROTEIN TOTAL: 7.3
RED BLOOD CELL COUNT: 4.47
RED CELL DISTRIBUTION WIDTH: 13.5
SODIUM: 137
WHITE BLOOD CELL COUNT: 5.9

## 2024-09-17 PROCEDURE — 99214 OFFICE O/P EST MOD 30 MIN: CPT | Performed by: NURSE PRACTITIONER

## 2024-09-17 RX ORDER — GABAPENTIN 300 MG/1
TAKE 1 CAPSULE (300 MG) BY ORAL ROUTE 3 TIMES PER DAY CAPSULE ORAL
Qty: 0 | Refills: 0 | COMMUNITY
Start: 1900-01-01

## 2024-09-17 RX ORDER — HYDRALAZINE HYDROCHLORIDE 50 MG/1
TAKE 1 TABLET BY ORAL ROUTE 3 TIMES A DAY TABLET ORAL
Qty: 0 | Refills: 0 | COMMUNITY
Start: 1900-01-01

## 2024-09-17 RX ORDER — ZOLPIDEM TARTRATE 10 MG/1
TAKE 1 TABLET (10 MG) BY ORAL ROUTE ONCE DAILY AT BEDTIME TABLET, FILM COATED ORAL 1
Qty: 0 | Refills: 0 | COMMUNITY
Start: 1900-01-01

## 2024-09-17 RX ORDER — AMITRIPTYLINE HYDROCHLORIDE 25 MG/1
1 TABLET AT BEDTIME TABLET, FILM COATED ORAL ONCE A DAY
Qty: 30 TABLET | Refills: 11

## 2024-09-17 RX ORDER — CHOLESTYRAMINE 4 G/9G
TAKE 2 SCOOPS (8 GRAM) EACH SCOOP BEING DISSOLVED IN 2 TO 6 OUNCES OF WATER OR NONCARBONATED BEVERAGE BY PO TWICE A DAY POWDER, FOR SUSPENSION ORAL
Qty: 1 | Refills: 11

## 2024-09-17 RX ORDER — POTASSIUM CHLORIDE 1500 MG/1
TAKE 1 TABLET (20 MEQ) BY ORAL ROUTE ONCE DAILY WITH FOOD TABLET, FILM COATED, EXTENDED RELEASE ORAL 1
Qty: 0 | Refills: 0 | COMMUNITY
Start: 1900-01-01

## 2024-09-17 RX ORDER — OXYBUTYNIN CHLORIDE 5 MG/1
TAKE 1 TABLET BY ORAL ROUTE DAILY TABLET ORAL 1
Qty: 0 | Refills: 0 | COMMUNITY
Start: 1900-01-01

## 2024-09-17 RX ORDER — DIFENOXIN AND ATROPINE SULFATE .025; 1 MG/1; MG/1
1 TABLET AS NEEDED TABLET ORAL
Qty: 120 TABLET | Refills: 3 | Status: ACTIVE | COMMUNITY

## 2024-09-17 RX ORDER — HYDROXYZINE HYDROCHLORIDE 25 MG/1
TAKE 1 TABLET BY ORAL ROUTE 4 TIMES A DAY TABLET, FILM COATED ORAL
Qty: 0 | Refills: 0 | COMMUNITY
Start: 1900-01-01

## 2024-09-17 RX ORDER — PROPRANOLOL HYDROCHLORIDE 80 MG/1
TAKE 1 TABLET BY ORAL ROUTE DAILY TABLET ORAL 1
Qty: 0 | Refills: 0 | COMMUNITY
Start: 1900-01-01

## 2024-09-17 RX ORDER — TIZANIDINE HYDROCHLORIDE 4 MG/1
TAKE 1 CAPSULE (4 MG) BY ORAL ROUTE 3 TIMES PER DAY CAPSULE ORAL
Qty: 0 | Refills: 0 | COMMUNITY
Start: 1900-01-01

## 2024-09-17 RX ORDER — OMEPRAZOLE 20 MG/1
TAKE 1 CAPSULE (20 MG) BY ORAL ROUTE ONCE DAILY BEFORE A MEAL CAPSULE, DELAYED RELEASE ORAL 1
Qty: 0 | Refills: 0 | COMMUNITY
Start: 1900-01-01

## 2024-09-17 RX ORDER — HYDROCODONE BITARTRATE AND ACETAMINOPHEN 10; 325 MG/1; MG/1
TAKE 1 TABLET BY ORAL ROUTE EVERY 6 HOURS AS NEEDED FOR PAIN TABLET ORAL
Qty: 0 | Refills: 0 | COMMUNITY
Start: 1900-01-01

## 2024-09-17 RX ORDER — PROMETHAZINE HYDROCHLORIDE 25 MG/1
TAKE 1 TABLET (25 MG) BY ORAL ROUTE EVERY 4-6 HOURS AS NEEDED TABLET ORAL
Qty: 0 | Refills: 0 | COMMUNITY
Start: 1900-01-01

## 2024-09-17 RX ORDER — LORAZEPAM 1 MG/1
TAKE 1 TABLET BY ORAL ROUTE 2 TIMES A DAY AS NEEDED TABLET ORAL 2
Qty: 0 | Refills: 0 | COMMUNITY
Start: 1900-01-01

## 2024-09-17 RX ORDER — FUROSEMIDE 40 MG/1
TAKE 1 TABLET (40 MG) BY ORAL ROUTE 2 TIMES PER DAY TABLET ORAL 2
Qty: 0 | Refills: 0 | COMMUNITY
Start: 1900-01-01

## 2024-09-17 RX ORDER — DICYCLOMINE HYDROCHLORIDE 10 MG/1
TAKE 1 CAPSULE (10 MG) BY ORAL ROUTE 3 TIMES PER DAY FOR 30 DAYS CAPSULE ORAL
Qty: 90 | Refills: 6

## 2024-09-17 RX ORDER — CHOLESTYRAMINE 4 G/9G
TAKE 2 SCOOPS (8 GRAM) EACH SCOOP BEING DISSOLVED IN 2 TO 6 OUNCES OF WATER OR NONCARBONATED BEVERAGE BY PO TWICE A DAY POWDER, FOR SUSPENSION ORAL
Qty: 1 | Refills: 11 | Status: ACTIVE | COMMUNITY

## 2024-09-17 RX ORDER — AMITRIPTYLINE HYDROCHLORIDE 25 MG/1
1 TABLET AT BEDTIME TABLET, FILM COATED ORAL ONCE A DAY
Qty: 30 TABLET | Refills: 11 | Status: ACTIVE | COMMUNITY

## 2024-09-17 RX ORDER — DICYCLOMINE HYDROCHLORIDE 10 MG/1
TAKE 1 CAPSULE (10 MG) BY ORAL ROUTE 3 TIMES PER DAY FOR 30 DAYS CAPSULE ORAL
Qty: 90 | Refills: 6 | Status: ACTIVE | COMMUNITY

## 2024-09-17 RX ORDER — PROMETHAZINE HYDROCHLORIDE 6.25 MG/5ML
TAKE 5 MILLILITERS BY ORAL ROUTE 3 TIMES A DAY AS NEEDED SOLUTION ORAL
Qty: 0 | Refills: 0 | COMMUNITY
Start: 1900-01-01

## 2024-09-17 RX ORDER — ALBUTEROL SULFATE 108 UG/1
INHALE 1 PUFF (90 MCG) BY INHALATION ROUTE EVERY 6 HOURS AS NEEDED AEROSOL, METERED RESPIRATORY (INHALATION)
Qty: 1 | Refills: 0 | COMMUNITY
Start: 1900-01-01

## 2024-09-17 RX ORDER — HALOBETASOL PROPIONATE 0.5 MG/G
APPLY A THIN LAYER TO THE AFFECTED AREA(S) BY TOPICAL ROUTE ONCE DAILY CREAM TOPICAL 1
Qty: 1 | Refills: 0 | COMMUNITY
Start: 1900-01-01

## 2024-09-17 RX ORDER — CETIRIZINE HYDROCHLORIDE 10 MG/1
TAKE 1 TABLET (10 MG) BY ORAL ROUTE ONCE DAILY TABLET, FILM COATED ORAL 1
Qty: 0 | Refills: 0 | COMMUNITY
Start: 1900-01-01

## 2024-09-17 RX ORDER — DIFENOXIN AND ATROPINE SULFATE .025; 1 MG/1; MG/1
1 TABLET AS NEEDED TABLET ORAL
Qty: 120 TABLET | Refills: 3 | OUTPATIENT

## 2024-09-17 RX ORDER — KETOCONAZOLE 20 MG/G
APPLY TO THE AFFECTED AREA(S) BY TOPICAL ROUTE ONCE DAILY CREAM TOPICAL 1
Qty: 1 | Refills: 0 | COMMUNITY
Start: 1900-01-01

## 2024-09-17 NOTE — HPI-TODAY'S VISIT:
9/17/2024 Ms. Riddle is  here for f/u of abdominal pain and diarrhea. She is taking AMT, bentyl, questran, and 6-8 motofen daily. Her BM are fairly well controlled. She is in a better living situation. She did have some very sharp cramping LLQ pain. This came and went for about an hour. She called the ER and they told her it could be her appendix. She did not go to the ER.  She has gained weight since cutting back smoking. CS

## 2024-09-18 ENCOUNTER — TELEPHONE ENCOUNTER (OUTPATIENT)
Dept: URBAN - NONMETROPOLITAN AREA CLINIC 13 | Facility: CLINIC | Age: 54
End: 2024-09-18

## 2024-09-27 ENCOUNTER — TELEPHONE ENCOUNTER (OUTPATIENT)
Dept: URBAN - NONMETROPOLITAN AREA CLINIC 13 | Facility: CLINIC | Age: 54
End: 2024-09-27

## 2024-10-14 ENCOUNTER — OFFICE VISIT (OUTPATIENT)
Dept: URBAN - METROPOLITAN AREA CLINIC 70 | Facility: CLINIC | Age: 54
End: 2024-10-14

## 2024-12-17 ENCOUNTER — OFFICE VISIT (OUTPATIENT)
Dept: URBAN - NONMETROPOLITAN AREA CLINIC 13 | Facility: CLINIC | Age: 54
End: 2024-12-17

## 2025-01-21 ENCOUNTER — TELEPHONE ENCOUNTER (OUTPATIENT)
Dept: URBAN - NONMETROPOLITAN AREA CLINIC 13 | Facility: CLINIC | Age: 55
End: 2025-01-21

## 2025-01-21 RX ORDER — DIFENOXIN AND ATROPINE SULFATE .025; 1 MG/1; MG/1
1 TABLET AS NEEDED TABLET ORAL
Qty: 120 TABLET | Refills: 3
End: 2025-05-21

## 2025-01-28 ENCOUNTER — TELEPHONE ENCOUNTER (OUTPATIENT)
Dept: URBAN - NONMETROPOLITAN AREA CLINIC 13 | Facility: CLINIC | Age: 55
End: 2025-01-28

## 2025-01-28 ENCOUNTER — TELEPHONE ENCOUNTER (OUTPATIENT)
Dept: URBAN - NONMETROPOLITAN AREA CLINIC 2 | Facility: CLINIC | Age: 55
End: 2025-01-28

## 2025-01-28 RX ORDER — DIFENOXIN AND ATROPINE SULFATE .025; 1 MG/1; MG/1
1 TABLET AS NEEDED TABLET ORAL
Qty: 120 TABLET | Refills: 3
End: 2025-05-28

## 2025-02-10 ENCOUNTER — OFFICE VISIT (OUTPATIENT)
Dept: URBAN - NONMETROPOLITAN AREA CLINIC 13 | Facility: CLINIC | Age: 55
End: 2025-02-10

## 2025-02-10 RX ORDER — LORAZEPAM 1 MG/1
TAKE 1 TABLET BY ORAL ROUTE 2 TIMES A DAY AS NEEDED TABLET ORAL 2
Qty: 0 | Refills: 0 | COMMUNITY
Start: 1900-01-01

## 2025-02-10 RX ORDER — HYDROXYZINE HYDROCHLORIDE 25 MG/1
TAKE 1 TABLET BY ORAL ROUTE 4 TIMES A DAY TABLET, FILM COATED ORAL
Qty: 0 | Refills: 0 | COMMUNITY
Start: 1900-01-01

## 2025-02-10 RX ORDER — HYDRALAZINE HYDROCHLORIDE 50 MG/1
TAKE 1 TABLET BY ORAL ROUTE 3 TIMES A DAY TABLET ORAL
Qty: 0 | Refills: 0 | COMMUNITY
Start: 1900-01-01

## 2025-02-10 RX ORDER — KETOCONAZOLE 20 MG/G
APPLY TO THE AFFECTED AREA(S) BY TOPICAL ROUTE ONCE DAILY CREAM TOPICAL 1
Qty: 1 | Refills: 0 | COMMUNITY
Start: 1900-01-01

## 2025-02-10 RX ORDER — HYDROCODONE BITARTRATE AND ACETAMINOPHEN 10; 325 MG/1; MG/1
TAKE 1 TABLET BY ORAL ROUTE EVERY 6 HOURS AS NEEDED FOR PAIN TABLET ORAL
Qty: 0 | Refills: 0 | COMMUNITY
Start: 1900-01-01

## 2025-02-10 RX ORDER — FUROSEMIDE 40 MG/1
TAKE 1 TABLET (40 MG) BY ORAL ROUTE 2 TIMES PER DAY TABLET ORAL 2
Qty: 0 | Refills: 0 | COMMUNITY
Start: 1900-01-01

## 2025-02-10 RX ORDER — POTASSIUM CHLORIDE 1500 MG/1
TAKE 1 TABLET (20 MEQ) BY ORAL ROUTE ONCE DAILY WITH FOOD TABLET, FILM COATED, EXTENDED RELEASE ORAL 1
Qty: 0 | Refills: 0 | COMMUNITY
Start: 1900-01-01

## 2025-02-10 RX ORDER — CHOLESTYRAMINE 4 G/9G
TAKE 2 SCOOPS (8 GRAM) EACH SCOOP BEING DISSOLVED IN 2 TO 6 OUNCES OF WATER OR NONCARBONATED BEVERAGE BY PO TWICE A DAY POWDER, FOR SUSPENSION ORAL
Qty: 1 | Refills: 11

## 2025-02-10 RX ORDER — GABAPENTIN 300 MG/1
TAKE 1 CAPSULE (300 MG) BY ORAL ROUTE 3 TIMES PER DAY CAPSULE ORAL
Qty: 0 | Refills: 0 | COMMUNITY
Start: 1900-01-01

## 2025-02-10 RX ORDER — CHOLESTYRAMINE 4 G/9G
TAKE 2 SCOOPS (8 GRAM) EACH SCOOP BEING DISSOLVED IN 2 TO 6 OUNCES OF WATER OR NONCARBONATED BEVERAGE BY PO TWICE A DAY POWDER, FOR SUSPENSION ORAL
Qty: 1 | Refills: 11 | Status: ACTIVE | COMMUNITY

## 2025-02-10 RX ORDER — OXYBUTYNIN CHLORIDE 5 MG/1
TAKE 1 TABLET BY ORAL ROUTE DAILY TABLET ORAL 1
Qty: 0 | Refills: 0 | COMMUNITY
Start: 1900-01-01

## 2025-02-10 RX ORDER — AMITRIPTYLINE HYDROCHLORIDE 25 MG/1
1 TABLET AT BEDTIME TABLET, FILM COATED ORAL ONCE A DAY
Qty: 30 TABLET | Refills: 11

## 2025-02-10 RX ORDER — DIFENOXIN AND ATROPINE SULFATE .025; 1 MG/1; MG/1
1 TABLET AS NEEDED TABLET ORAL
Qty: 120 TABLET | Refills: 3 | OUTPATIENT

## 2025-02-10 RX ORDER — PROMETHAZINE HYDROCHLORIDE 25 MG/1
TAKE 1 TABLET (25 MG) BY ORAL ROUTE EVERY 4-6 HOURS AS NEEDED TABLET ORAL
Qty: 0 | Refills: 0 | COMMUNITY
Start: 1900-01-01

## 2025-02-10 RX ORDER — ZOLPIDEM TARTRATE 10 MG/1
TAKE 1 TABLET (10 MG) BY ORAL ROUTE ONCE DAILY AT BEDTIME TABLET, FILM COATED ORAL 1
Qty: 0 | Refills: 0 | COMMUNITY
Start: 1900-01-01

## 2025-02-10 RX ORDER — PROPRANOLOL HYDROCHLORIDE 80 MG/1
TAKE 1 TABLET BY ORAL ROUTE DAILY TABLET ORAL 1
Qty: 0 | Refills: 0 | COMMUNITY
Start: 1900-01-01

## 2025-02-10 RX ORDER — DIFENOXIN AND ATROPINE SULFATE .025; 1 MG/1; MG/1
1 TABLET AS NEEDED TABLET ORAL
Qty: 120 TABLET | Refills: 3 | Status: ACTIVE | COMMUNITY
End: 2025-05-28

## 2025-02-10 RX ORDER — TIZANIDINE HYDROCHLORIDE 4 MG/1
TAKE 1 CAPSULE (4 MG) BY ORAL ROUTE 3 TIMES PER DAY CAPSULE ORAL
Qty: 0 | Refills: 0 | COMMUNITY
Start: 1900-01-01

## 2025-02-10 RX ORDER — AMITRIPTYLINE HYDROCHLORIDE 25 MG/1
1 TABLET AT BEDTIME TABLET, FILM COATED ORAL ONCE A DAY
Qty: 30 TABLET | Refills: 11 | Status: ACTIVE | COMMUNITY

## 2025-02-10 RX ORDER — CETIRIZINE HYDROCHLORIDE 10 MG/1
TAKE 1 TABLET (10 MG) BY ORAL ROUTE ONCE DAILY TABLET, FILM COATED ORAL 1
Qty: 0 | Refills: 0 | COMMUNITY
Start: 1900-01-01

## 2025-02-10 RX ORDER — OMEPRAZOLE 20 MG/1
TAKE 1 CAPSULE (20 MG) BY ORAL ROUTE ONCE DAILY BEFORE A MEAL CAPSULE, DELAYED RELEASE ORAL 1
Qty: 0 | Refills: 0 | COMMUNITY
Start: 1900-01-01

## 2025-02-10 RX ORDER — ALBUTEROL SULFATE 108 UG/1
INHALE 1 PUFF (90 MCG) BY INHALATION ROUTE EVERY 6 HOURS AS NEEDED AEROSOL, METERED RESPIRATORY (INHALATION)
Qty: 1 | Refills: 0 | COMMUNITY
Start: 1900-01-01

## 2025-02-10 RX ORDER — PROMETHAZINE HYDROCHLORIDE 6.25 MG/5ML
TAKE 5 MILLILITERS BY ORAL ROUTE 3 TIMES A DAY AS NEEDED SOLUTION ORAL
Qty: 0 | Refills: 0 | COMMUNITY
Start: 1900-01-01

## 2025-02-10 RX ORDER — DICYCLOMINE HYDROCHLORIDE 10 MG/1
TAKE 1 CAPSULE (10 MG) BY ORAL ROUTE 3 TIMES PER DAY FOR 30 DAYS CAPSULE ORAL
Qty: 90 | Refills: 6

## 2025-02-10 RX ORDER — HALOBETASOL PROPIONATE 0.5 MG/G
APPLY A THIN LAYER TO THE AFFECTED AREA(S) BY TOPICAL ROUTE ONCE DAILY CREAM TOPICAL 1
Qty: 1 | Refills: 0 | COMMUNITY
Start: 1900-01-01

## 2025-02-10 RX ORDER — DICYCLOMINE HYDROCHLORIDE 10 MG/1
TAKE 1 CAPSULE (10 MG) BY ORAL ROUTE 3 TIMES PER DAY FOR 30 DAYS CAPSULE ORAL
Qty: 90 | Refills: 6 | Status: ACTIVE | COMMUNITY

## 2025-02-10 NOTE — HPI-TODAY'S VISIT:
2/10/2025 Ms. Riddle is  here for f/u of abdominal pain and diarrhea. She is taking AMT, bentyl, questran, and 6-8 motofen daily. Her BM are fairly well controlled. She is in a better living situation. She did have some very sharp cramping LLQ pain. This came and went for about an hour. She called the ER and they told her it could be her appendix. She did not go to the ER.  She has gained weight since cutting back smoking. CS
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
## 2025-02-12 ENCOUNTER — TELEPHONE ENCOUNTER (OUTPATIENT)
Dept: URBAN - METROPOLITAN AREA CLINIC 6 | Facility: CLINIC | Age: 55
End: 2025-02-12

## 2025-02-12 RX ORDER — DIFENOXIN AND ATROPINE SULFATE .025; 1 MG/1; MG/1
1 TABLET AS NEEDED TABLET ORAL
Qty: 180 TABLET | Refills: 3
End: 2025-06-12

## 2025-03-31 ENCOUNTER — TELEPHONE ENCOUNTER (OUTPATIENT)
Dept: URBAN - NONMETROPOLITAN AREA CLINIC 2 | Facility: CLINIC | Age: 55
End: 2025-03-31

## 2025-03-31 RX ORDER — DIFENOXIN AND ATROPINE SULFATE .025; 1 MG/1; MG/1
1 TABLET AS NEEDED TABLET ORAL
Qty: 180 TABLET | Refills: 3
End: 2025-07-29

## 2025-05-09 ENCOUNTER — TELEPHONE ENCOUNTER (OUTPATIENT)
Dept: URBAN - NONMETROPOLITAN AREA CLINIC 2 | Facility: CLINIC | Age: 55
End: 2025-05-09

## 2025-05-09 ENCOUNTER — OFFICE VISIT (OUTPATIENT)
Dept: URBAN - METROPOLITAN AREA TELEHEALTH 2 | Facility: TELEHEALTH | Age: 55
End: 2025-05-09
Payer: COMMERCIAL

## 2025-05-09 DIAGNOSIS — K21.9 GERD (GASTROESOPHAGEAL REFLUX DISEASE): ICD-10-CM

## 2025-05-09 DIAGNOSIS — R19.7 DIARRHEA: ICD-10-CM

## 2025-05-09 DIAGNOSIS — R10.9 ABDOMINAL PAIN: ICD-10-CM

## 2025-05-09 PROCEDURE — 99213 OFFICE O/P EST LOW 20 MIN: CPT | Performed by: NURSE PRACTITIONER

## 2025-05-09 RX ORDER — AMITRIPTYLINE HYDROCHLORIDE 25 MG/1
1 TABLET AT BEDTIME TABLET, FILM COATED ORAL ONCE A DAY
Qty: 30 TABLET | Refills: 11
Start: 2025-05-09

## 2025-05-09 RX ORDER — PROPRANOLOL HYDROCHLORIDE 80 MG/1
TAKE 1 TABLET BY ORAL ROUTE DAILY TABLET ORAL 1
Qty: 0 | Refills: 0 | COMMUNITY
Start: 1900-01-01

## 2025-05-09 RX ORDER — KETOCONAZOLE 20 MG/G
APPLY TO THE AFFECTED AREA(S) BY TOPICAL ROUTE ONCE DAILY CREAM TOPICAL 1
Qty: 1 | Refills: 0 | COMMUNITY
Start: 1900-01-01

## 2025-05-09 RX ORDER — AMITRIPTYLINE HYDROCHLORIDE 25 MG/1
1 TABLET AT BEDTIME TABLET, FILM COATED ORAL ONCE A DAY
Qty: 30 TABLET | Refills: 11 | Status: ACTIVE | COMMUNITY

## 2025-05-09 RX ORDER — LORAZEPAM 1 MG/1
TAKE 1 TABLET BY ORAL ROUTE 2 TIMES A DAY AS NEEDED TABLET ORAL 2
Qty: 0 | Refills: 0 | COMMUNITY
Start: 1900-01-01

## 2025-05-09 RX ORDER — GABAPENTIN 300 MG/1
TAKE 1 CAPSULE (300 MG) BY ORAL ROUTE 3 TIMES PER DAY CAPSULE ORAL
Qty: 0 | Refills: 0 | COMMUNITY
Start: 1900-01-01

## 2025-05-09 RX ORDER — HYDROCODONE BITARTRATE AND ACETAMINOPHEN 10; 325 MG/1; MG/1
TAKE 1 TABLET BY ORAL ROUTE EVERY 6 HOURS AS NEEDED FOR PAIN TABLET ORAL
Qty: 0 | Refills: 0 | COMMUNITY
Start: 1900-01-01

## 2025-05-09 RX ORDER — HYDRALAZINE HYDROCHLORIDE 50 MG/1
TAKE 1 TABLET BY ORAL ROUTE 3 TIMES A DAY TABLET ORAL
Qty: 0 | Refills: 0 | COMMUNITY
Start: 1900-01-01

## 2025-05-09 RX ORDER — HYDROXYZINE HYDROCHLORIDE 25 MG/1
TAKE 1 TABLET BY ORAL ROUTE 4 TIMES A DAY TABLET, FILM COATED ORAL
Qty: 0 | Refills: 0 | COMMUNITY
Start: 1900-01-01

## 2025-05-09 RX ORDER — POTASSIUM CHLORIDE 1500 MG/1
TAKE 1 TABLET (20 MEQ) BY ORAL ROUTE ONCE DAILY WITH FOOD TABLET, FILM COATED, EXTENDED RELEASE ORAL 1
Qty: 0 | Refills: 0 | COMMUNITY
Start: 1900-01-01

## 2025-05-09 RX ORDER — DIFENOXIN AND ATROPINE SULFATE .025; 1 MG/1; MG/1
1 TABLET AS NEEDED TABLET ORAL
Qty: 180 TABLET | Refills: 3 | Status: ACTIVE | COMMUNITY
End: 2025-07-29

## 2025-05-09 RX ORDER — DIFENOXIN AND ATROPINE SULFATE .025; 1 MG/1; MG/1
1-2 TABLET AS NEEDED TABLET ORAL
Refills: 3 | OUTPATIENT
Start: 2025-05-09 | End: 2025-09-06

## 2025-05-09 RX ORDER — ALBUTEROL SULFATE 108 UG/1
INHALE 1 PUFF (90 MCG) BY INHALATION ROUTE EVERY 6 HOURS AS NEEDED AEROSOL, METERED RESPIRATORY (INHALATION)
Qty: 1 | Refills: 0 | COMMUNITY
Start: 1900-01-01

## 2025-05-09 RX ORDER — TIZANIDINE HYDROCHLORIDE 4 MG/1
TAKE 1 CAPSULE (4 MG) BY ORAL ROUTE 3 TIMES PER DAY CAPSULE ORAL
Qty: 0 | Refills: 0 | COMMUNITY
Start: 1900-01-01

## 2025-05-09 RX ORDER — DICYCLOMINE HYDROCHLORIDE 10 MG/1
TAKE 1 CAPSULE (10 MG) BY ORAL ROUTE 3 TIMES PER DAY FOR 30 DAYS CAPSULE ORAL
Qty: 90 | Refills: 6 | Status: ACTIVE | COMMUNITY

## 2025-05-09 RX ORDER — CHOLESTYRAMINE 4 G/9G
TAKE 2 SCOOPS (8 GRAM) EACH SCOOP BEING DISSOLVED IN 2 TO 6 OUNCES OF WATER OR NONCARBONATED BEVERAGE BY PO TWICE A DAY POWDER, FOR SUSPENSION ORAL
Qty: 1 | Refills: 11 | Status: ACTIVE | COMMUNITY

## 2025-05-09 RX ORDER — FUROSEMIDE 40 MG/1
TAKE 1 TABLET (40 MG) BY ORAL ROUTE 2 TIMES PER DAY TABLET ORAL 2
Qty: 0 | Refills: 0 | COMMUNITY
Start: 1900-01-01

## 2025-05-09 RX ORDER — OMEPRAZOLE 20 MG/1
TAKE 1 CAPSULE (20 MG) BY ORAL ROUTE ONCE DAILY BEFORE A MEAL CAPSULE, DELAYED RELEASE ORAL 1
Qty: 0 | Refills: 0 | COMMUNITY
Start: 1900-01-01

## 2025-05-09 RX ORDER — ZOLPIDEM TARTRATE 10 MG/1
TAKE 1 TABLET (10 MG) BY ORAL ROUTE ONCE DAILY AT BEDTIME TABLET, FILM COATED ORAL 1
Qty: 0 | Refills: 0 | COMMUNITY
Start: 1900-01-01

## 2025-05-09 RX ORDER — PROMETHAZINE HYDROCHLORIDE 6.25 MG/5ML
TAKE 5 MILLILITERS BY ORAL ROUTE 3 TIMES A DAY AS NEEDED SOLUTION ORAL
Qty: 0 | Refills: 0 | COMMUNITY
Start: 1900-01-01

## 2025-05-09 RX ORDER — OXYBUTYNIN CHLORIDE 5 MG/1
TAKE 1 TABLET BY ORAL ROUTE DAILY TABLET ORAL 1
Qty: 0 | Refills: 0 | COMMUNITY
Start: 1900-01-01

## 2025-05-09 RX ORDER — PROMETHAZINE HYDROCHLORIDE 25 MG/1
TAKE 1 TABLET (25 MG) BY ORAL ROUTE EVERY 4-6 HOURS AS NEEDED TABLET ORAL
Qty: 0 | Refills: 0 | COMMUNITY
Start: 1900-01-01

## 2025-05-09 RX ORDER — DICYCLOMINE HYDROCHLORIDE 10 MG/1
TAKE 1 CAPSULE (10 MG) BY ORAL ROUTE 3 TIMES PER DAY FOR 30 DAYS CAPSULE ORAL
Qty: 90 | Refills: 6
Start: 2025-05-09

## 2025-05-09 RX ORDER — HALOBETASOL PROPIONATE 0.5 MG/G
APPLY A THIN LAYER TO THE AFFECTED AREA(S) BY TOPICAL ROUTE ONCE DAILY CREAM TOPICAL 1
Qty: 1 | Refills: 0 | COMMUNITY
Start: 1900-01-01

## 2025-05-09 RX ORDER — CETIRIZINE HYDROCHLORIDE 10 MG/1
TAKE 1 TABLET (10 MG) BY ORAL ROUTE ONCE DAILY TABLET, FILM COATED ORAL 1
Qty: 0 | Refills: 0 | COMMUNITY
Start: 1900-01-01

## 2025-05-09 RX ORDER — CHOLESTYRAMINE 4 G/9G
TAKE 2 SCOOPS (8 GRAM) EACH SCOOP BEING DISSOLVED IN 2 TO 6 OUNCES OF WATER OR NONCARBONATED BEVERAGE BY PO TWICE A DAY POWDER, FOR SUSPENSION ORAL
Qty: 1 | Refills: 11
Start: 2025-05-09

## 2025-05-09 NOTE — HPI-TODAY'S VISIT:
5/9/2025 Ms. Riddle is evaluated via telehealth for f/u of abdominal pain and diarrhea. She is taking AMT, bentyl, questran, and 6-8 motofen daily. Her son is missing since January. He is 36 and has twin girls that are 10. She is very stressed. This is giving her abdominal pain and diarrhea. She is back smoking and gained about 15 pounds. CS

## 2025-05-20 ENCOUNTER — TELEPHONE ENCOUNTER (OUTPATIENT)
Dept: URBAN - NONMETROPOLITAN AREA CLINIC 13 | Facility: CLINIC | Age: 55
End: 2025-05-20

## 2025-07-16 ENCOUNTER — TELEPHONE ENCOUNTER (OUTPATIENT)
Dept: URBAN - NONMETROPOLITAN AREA CLINIC 2 | Facility: CLINIC | Age: 55
End: 2025-07-16

## 2025-07-16 RX ORDER — DICYCLOMINE HYDROCHLORIDE 10 MG/1
TAKE 1 CAPSULE (10 MG) BY ORAL ROUTE 3 TIMES PER DAY FOR 30 DAYS CAPSULE ORAL
Qty: 90 | Refills: 11
Start: 2025-05-09

## 2025-08-08 ENCOUNTER — TELEPHONE ENCOUNTER (OUTPATIENT)
Dept: URBAN - NONMETROPOLITAN AREA CLINIC 2 | Facility: CLINIC | Age: 55
End: 2025-08-08

## 2025-08-08 ENCOUNTER — OFFICE VISIT (OUTPATIENT)
Dept: URBAN - METROPOLITAN AREA TELEHEALTH 2 | Facility: TELEHEALTH | Age: 55
End: 2025-08-08
Payer: COMMERCIAL

## 2025-08-08 DIAGNOSIS — R10.9 ABDOMINAL PAIN: ICD-10-CM

## 2025-08-08 DIAGNOSIS — K21.9 GERD (GASTROESOPHAGEAL REFLUX DISEASE): ICD-10-CM

## 2025-08-08 DIAGNOSIS — Z12.11 COLON CANCER SCREENING: ICD-10-CM

## 2025-08-08 DIAGNOSIS — R19.7 DIARRHEA: ICD-10-CM

## 2025-08-08 PROCEDURE — 99214 OFFICE O/P EST MOD 30 MIN: CPT | Performed by: NURSE PRACTITIONER

## 2025-08-08 RX ORDER — ALBUTEROL SULFATE 108 UG/1
INHALE 1 PUFF (90 MCG) BY INHALATION ROUTE EVERY 6 HOURS AS NEEDED AEROSOL, METERED RESPIRATORY (INHALATION)
Qty: 1 | Refills: 0 | COMMUNITY
Start: 1900-01-01

## 2025-08-08 RX ORDER — CETIRIZINE HYDROCHLORIDE 10 MG/1
TAKE 1 TABLET (10 MG) BY ORAL ROUTE ONCE DAILY TABLET, FILM COATED ORAL 1
Qty: 0 | Refills: 0 | COMMUNITY
Start: 1900-01-01

## 2025-08-08 RX ORDER — AMITRIPTYLINE HYDROCHLORIDE 25 MG/1
1 TABLET AT BEDTIME TABLET, FILM COATED ORAL ONCE A DAY
Qty: 30 TABLET | Refills: 11
Start: 2025-08-08

## 2025-08-08 RX ORDER — POTASSIUM CHLORIDE 1500 MG/1
TAKE 1 TABLET (20 MEQ) BY ORAL ROUTE ONCE DAILY WITH FOOD TABLET, FILM COATED, EXTENDED RELEASE ORAL 1
Qty: 0 | Refills: 0 | COMMUNITY
Start: 1900-01-01

## 2025-08-08 RX ORDER — KETOCONAZOLE 20 MG/G
APPLY TO THE AFFECTED AREA(S) BY TOPICAL ROUTE ONCE DAILY CREAM TOPICAL 1
Qty: 1 | Refills: 0 | COMMUNITY
Start: 1900-01-01

## 2025-08-08 RX ORDER — HALOBETASOL PROPIONATE 0.5 MG/G
APPLY A THIN LAYER TO THE AFFECTED AREA(S) BY TOPICAL ROUTE ONCE DAILY CREAM TOPICAL 1
Qty: 1 | Refills: 0 | COMMUNITY
Start: 1900-01-01

## 2025-08-08 RX ORDER — CHOLESTYRAMINE 4 G/9G
TAKE 2 SCOOPS (8 GRAM) EACH SCOOP BEING DISSOLVED IN 2 TO 6 OUNCES OF WATER OR NONCARBONATED BEVERAGE BY PO TWICE A DAY POWDER, FOR SUSPENSION ORAL
Qty: 1 | Refills: 11
Start: 2025-08-08

## 2025-08-08 RX ORDER — DIFENOXIN AND ATROPINE SULFATE .025; 1 MG/1; MG/1
1-2 TABLET AS NEEDED TABLET ORAL
Refills: 3 | OUTPATIENT
Start: 2025-08-08 | End: 2025-12-06

## 2025-08-08 RX ORDER — PROMETHAZINE HYDROCHLORIDE 6.25 MG/5ML
TAKE 5 MILLILITERS BY ORAL ROUTE 3 TIMES A DAY AS NEEDED SOLUTION ORAL
Qty: 0 | Refills: 0 | COMMUNITY
Start: 1900-01-01

## 2025-08-08 RX ORDER — HYDRALAZINE HYDROCHLORIDE 50 MG/1
TAKE 1 TABLET BY ORAL ROUTE 3 TIMES A DAY TABLET ORAL
Qty: 0 | Refills: 0 | COMMUNITY
Start: 1900-01-01

## 2025-08-08 RX ORDER — PROMETHAZINE HYDROCHLORIDE 25 MG/1
TAKE 1 TABLET (25 MG) BY ORAL ROUTE EVERY 4-6 HOURS AS NEEDED TABLET ORAL
Qty: 0 | Refills: 0 | COMMUNITY
Start: 1900-01-01

## 2025-08-08 RX ORDER — OMEPRAZOLE 20 MG/1
TAKE 1 CAPSULE (20 MG) BY ORAL ROUTE ONCE DAILY BEFORE A MEAL CAPSULE, DELAYED RELEASE ORAL 1
Qty: 0 | Refills: 0 | COMMUNITY
Start: 1900-01-01

## 2025-08-08 RX ORDER — CHOLESTYRAMINE 4 G/9G
TAKE 2 SCOOPS (8 GRAM) EACH SCOOP BEING DISSOLVED IN 2 TO 6 OUNCES OF WATER OR NONCARBONATED BEVERAGE BY PO TWICE A DAY POWDER, FOR SUSPENSION ORAL
Qty: 1 | Refills: 11 | Status: ACTIVE | COMMUNITY
Start: 2025-05-09

## 2025-08-08 RX ORDER — OXYBUTYNIN CHLORIDE 5 MG/1
TAKE 1 TABLET BY ORAL ROUTE DAILY TABLET ORAL 1
Qty: 0 | Refills: 0 | COMMUNITY
Start: 1900-01-01

## 2025-08-08 RX ORDER — LORAZEPAM 1 MG/1
TAKE 1 TABLET BY ORAL ROUTE 2 TIMES A DAY AS NEEDED TABLET ORAL 2
Qty: 0 | Refills: 0 | COMMUNITY
Start: 1900-01-01

## 2025-08-08 RX ORDER — DICYCLOMINE HYDROCHLORIDE 10 MG/1
TAKE 1 CAPSULE (10 MG) BY ORAL ROUTE 3 TIMES PER DAY FOR 30 DAYS CAPSULE ORAL
Qty: 90 | Refills: 6
Start: 2025-08-08

## 2025-08-08 RX ORDER — DICYCLOMINE HYDROCHLORIDE 10 MG/1
TAKE 1 CAPSULE (10 MG) BY ORAL ROUTE 3 TIMES PER DAY FOR 30 DAYS CAPSULE ORAL
Qty: 90 | Refills: 11 | Status: ACTIVE | COMMUNITY
Start: 2025-05-09

## 2025-08-08 RX ORDER — GABAPENTIN 300 MG/1
TAKE 1 CAPSULE (300 MG) BY ORAL ROUTE 3 TIMES PER DAY CAPSULE ORAL
Qty: 0 | Refills: 0 | COMMUNITY
Start: 1900-01-01

## 2025-08-08 RX ORDER — HYDROXYZINE HYDROCHLORIDE 25 MG/1
TAKE 1 TABLET BY ORAL ROUTE 4 TIMES A DAY TABLET, FILM COATED ORAL
Qty: 0 | Refills: 0 | COMMUNITY
Start: 1900-01-01

## 2025-08-08 RX ORDER — AMITRIPTYLINE HYDROCHLORIDE 25 MG/1
1 TABLET AT BEDTIME TABLET, FILM COATED ORAL ONCE A DAY
Qty: 30 TABLET | Refills: 11 | Status: ACTIVE | COMMUNITY
Start: 2025-05-09

## 2025-08-08 RX ORDER — CHOLESTYRAMINE 4 G/9G
TAKE 2 SCOOPS (8 GRAM) EACH SCOOP BEING DISSOLVED IN 2 TO 6 OUNCES OF WATER OR NONCARBONATED BEVERAGE BY PO TWICE A DAY POWDER, FOR SUSPENSION ORAL
Qty: 1 | Refills: 11 | Status: ACTIVE | COMMUNITY

## 2025-08-08 RX ORDER — DIFENOXIN AND ATROPINE SULFATE .025; 1 MG/1; MG/1
1-2 TABLET AS NEEDED TABLET ORAL
Refills: 3 | Status: ACTIVE | COMMUNITY
Start: 2025-05-09 | End: 2025-09-06

## 2025-08-08 RX ORDER — PROPRANOLOL HYDROCHLORIDE 80 MG/1
TAKE 1 TABLET BY ORAL ROUTE DAILY TABLET ORAL 1
Qty: 0 | Refills: 0 | COMMUNITY
Start: 1900-01-01

## 2025-08-08 RX ORDER — ZOLPIDEM TARTRATE 10 MG/1
TAKE 1 TABLET (10 MG) BY ORAL ROUTE ONCE DAILY AT BEDTIME TABLET, FILM COATED ORAL 1
Qty: 0 | Refills: 0 | COMMUNITY
Start: 1900-01-01

## 2025-08-08 RX ORDER — FUROSEMIDE 40 MG/1
TAKE 1 TABLET (40 MG) BY ORAL ROUTE 2 TIMES PER DAY TABLET ORAL 2
Qty: 0 | Refills: 0 | COMMUNITY
Start: 1900-01-01

## 2025-08-08 RX ORDER — TIZANIDINE HYDROCHLORIDE 4 MG/1
TAKE 1 CAPSULE (4 MG) BY ORAL ROUTE 3 TIMES PER DAY CAPSULE ORAL
Qty: 0 | Refills: 0 | COMMUNITY
Start: 1900-01-01

## 2025-08-08 RX ORDER — HYDROCODONE BITARTRATE AND ACETAMINOPHEN 10; 325 MG/1; MG/1
TAKE 1 TABLET BY ORAL ROUTE EVERY 6 HOURS AS NEEDED FOR PAIN TABLET ORAL
Qty: 0 | Refills: 0 | COMMUNITY
Start: 1900-01-01